# Patient Record
Sex: FEMALE | Race: WHITE | NOT HISPANIC OR LATINO | Employment: UNEMPLOYED | URBAN - METROPOLITAN AREA
[De-identification: names, ages, dates, MRNs, and addresses within clinical notes are randomized per-mention and may not be internally consistent; named-entity substitution may affect disease eponyms.]

---

## 2023-12-13 ENCOUNTER — TELEPHONE (OUTPATIENT)
Age: 28
End: 2023-12-13

## 2023-12-13 NOTE — TELEPHONE ENCOUNTER
Patient called because she took an at home pregnancy test and it came out positive, but she wants to double check with a blood test. She would like the doctor to upload a lab order for blood work so she can confirm. She said she spoke with her obgyn and they advised her to reach out to her pcp for an order. She would like the order uploaded to [address] [place] [address].

## 2023-12-13 NOTE — TELEPHONE ENCOUNTER
It does not look like the patient is established here yet, she has a future appointment with Dr. Radha Sandoval.

## 2023-12-14 NOTE — TELEPHONE ENCOUNTER
Pt aware, she is coming to see Dr. Megan Beth to establish in a few days and will address it at that point.

## 2023-12-21 ENCOUNTER — OFFICE VISIT (OUTPATIENT)
Dept: FAMILY MEDICINE CLINIC | Facility: MEDICAL CENTER | Age: 28
End: 2023-12-21
Payer: COMMERCIAL

## 2023-12-21 VITALS
WEIGHT: 144 LBS | SYSTOLIC BLOOD PRESSURE: 108 MMHG | OXYGEN SATURATION: 100 % | HEART RATE: 106 BPM | HEART RATE: 106 BPM | TEMPERATURE: 98 F | WEIGHT: 144 LBS | OXYGEN SATURATION: 100 % | HEIGHT: 64 IN | TEMPERATURE: 98 F | HEIGHT: 64 IN | RESPIRATION RATE: 16 BRPM | SYSTOLIC BLOOD PRESSURE: 108 MMHG | RESPIRATION RATE: 16 BRPM | DIASTOLIC BLOOD PRESSURE: 66 MMHG | BODY MASS INDEX: 24.59 KG/M2 | BODY MASS INDEX: 24.59 KG/M2 | DIASTOLIC BLOOD PRESSURE: 66 MMHG

## 2023-12-21 DIAGNOSIS — J02.9 SORE THROAT: ICD-10-CM

## 2023-12-21 DIAGNOSIS — R00.0 TACHYCARDIA: ICD-10-CM

## 2023-12-21 DIAGNOSIS — N92.6 MISSED PERIOD: ICD-10-CM

## 2023-12-21 DIAGNOSIS — Z13.29 SCREENING FOR THYROID DISORDER: ICD-10-CM

## 2023-12-21 DIAGNOSIS — Z13.220 SCREENING CHOLESTEROL LEVEL: ICD-10-CM

## 2023-12-21 DIAGNOSIS — Z11.4 SCREENING FOR HIV (HUMAN IMMUNODEFICIENCY VIRUS): ICD-10-CM

## 2023-12-21 DIAGNOSIS — Z11.59 NEED FOR HEPATITIS C SCREENING TEST: ICD-10-CM

## 2023-12-21 DIAGNOSIS — Z13.89 NEPHROPATHY SCREEN: ICD-10-CM

## 2023-12-21 DIAGNOSIS — Z76.89 ENCOUNTER TO ESTABLISH CARE: Primary | ICD-10-CM

## 2023-12-21 PROBLEM — Z78.9 NO KNOWN HEALTH PROBLEMS: Status: ACTIVE | Noted: 2023-12-21

## 2023-12-21 LAB
SL AMB POCT URINE HCG: POSITIVE
SL AMB POCT URINE HCG: POSITIVE

## 2023-12-21 PROCEDURE — 81025 URINE PREGNANCY TEST: CPT | Performed by: STUDENT IN AN ORGANIZED HEALTH CARE EDUCATION/TRAINING PROGRAM

## 2023-12-21 PROCEDURE — 99204 OFFICE O/P NEW MOD 45 MIN: CPT | Performed by: STUDENT IN AN ORGANIZED HEALTH CARE EDUCATION/TRAINING PROGRAM

## 2023-12-21 NOTE — PROGRESS NOTES
[facility] - Clinic Note  Rupert Palacios DO, 23     Christos Fall MRN: [id number] : 1995 Age: 28 y.o.     Assessment/Plan     1. Encounter to establish care    -Presents to establish care  -She establishing with OB/GYN next week  -She will return for annual physical in 6 months and sooner as needed    2. Screening cholesterol level    - Lipid Panel with Direct LDL reflex; Future    3. Nephropathy screen    - Comprehensive metabolic panel; Future    4. Screening for thyroid disorder    - TSH, 3rd generation with Free T4 reflex; Future    5. Need for hepatitis C screening test    - Hepatitis C Antibody; Future    6. Screening for HIV (human immunodeficiency virus)    - HIV 1/2 AG/AB w Reflex [facility] for 2 yr old and above; Future    7. Missed period    - hCG, quantitative; Future  - POCT urine HCG positive   -Appointment with OB/GYN next week    8. Tachycardia    -No chest pain, no shortness of breath, no palpitations  - TSH, 3rd generation with Free T4 reflex; Future  - CBC and differential; Future  - ECG 12 lead; Future    9. Sore throat    -Supportive care measures, call if symptoms worsening or not improving or appearance of new symptoms    Christos Fall acknowledged understanding of treatment plan, all questions answered.    Subjective      Christos Fall is a 28 y.o. female who presents to establish care.  Patient has not had a PCP prior to this.  Patient does not have a gynecologist as of yet but, does states she has an appointment with OB/GYN next week.  Patient states she never had a Pap smear.  Patient states she took an at home pregnancy test on 23 and it was positive.  She is currently taking a prenatal vitamin daily.  She denies any allergies to food, environment or medications.  She does not smoke or consume alcohol.  No past surgical history.  Patient states onset today bit of a sore throat otherwise no other complaints.  She denies any known sick contacts. Patient works as a  ".  Elevated heart rate today, patient describes she has not seen a PCP prior to this but, is not overtly anxious.  She denies any chest pain, shortness of breath or palpitations.    The following portions of the patient's history were reviewed and updated as appropriate: allergies, current medications, past family history, past medical history, past social history, past surgical history and problem list.     History reviewed. No pertinent past medical history.    No Known Allergies    History reviewed. No pertinent surgical history.    History reviewed. No pertinent family history.    Social History     Socioeconomic History    Marital status: /Civil Union     Spouse name: None    Number of children: None    Years of education: None    Highest education level: None   Occupational History    None   Tobacco Use    Smoking status: Never    Smokeless tobacco: Never   Substance and Sexual Activity    Alcohol use: None    Drug use: None    Sexual activity: None   Other Topics Concern    None   Social History Narrative    None     Social Determinants of Health     Financial Resource Strain: Not on file   Food Insecurity: Not on file   Transportation Needs: Not on file   Physical Activity: Not on file   Stress: Not on file   Social Connections: Not on file   Intimate Partner Violence: Not on file   Housing Stability: Not on file       Current Outpatient Medications   Medication Sig Dispense Refill    Prenatal Vit-Fe Fumarate-FA (PRENATAL VITAMINS PO) Take by mouth       No current facility-administered medications for this visit.       Review of Systems     As noted in HPI    Objective      /66 (BP Location: Left arm, Patient Position: Sitting, Cuff Size: Adult)   Pulse (!) 106   Temp 98 °F (36.7 °C)   Resp 16   Ht 5' 4.17\" (1.63 m)   Wt 65.3 kg (144 lb)   LMP 11/16/2023 (Exact Date)   SpO2 100%   BMI 24.58 kg/m²     Physical Exam  Vitals reviewed.   Constitutional:       General: She " "is not in acute distress.     Appearance: Normal appearance.   HENT:      Head: Normocephalic and atraumatic.      Nose: Nose normal.      Mouth/Throat:      Mouth: Mucous membranes are moist.      Pharynx: Oropharynx is clear. Posterior oropharyngeal erythema present. No oropharyngeal exudate.   Eyes:      General:         Right eye: No discharge.         Left eye: No discharge.      Extraocular Movements: Extraocular movements intact.      Conjunctiva/sclera: Conjunctivae normal.      Pupils: Pupils are equal, round, and reactive to light.   Cardiovascular:      Rate and Rhythm: Regular rhythm. Tachycardia present.      Pulses: Normal pulses.      Heart sounds: Normal heart sounds.   Pulmonary:      Effort: Pulmonary effort is normal.      Breath sounds: Normal breath sounds.   Abdominal:      General: Abdomen is flat. Bowel sounds are normal.      Palpations: Abdomen is soft.      Tenderness: There is no abdominal tenderness.   Musculoskeletal:      Cervical back: Neck supple.      Right lower leg: No edema.      Left lower leg: No edema.   Lymphadenopathy:      Cervical: No cervical adenopathy.   Skin:     General: Skin is warm and dry.      Capillary Refill: Capillary refill takes less than 2 seconds.   Neurological:      Mental Status: She is alert and oriented to person, place, and time.   Psychiatric:         Mood and Affect: Mood normal.         Behavior: Behavior normal.         Thought Content: Thought content normal.         Judgment: Judgment normal.             Some portions of this record may have been generated with voice recognition software. There may be translation, syntax, or grammatical errors. Occasional wrong word or \"sound-a-like\" substitutions may have occurred due to the inherent limitations of the voice recognition software. Read the chart carefully and recognize, using context, where substations may have occurred. If you have any questions, please contact the dictating provider for " clarification or correction, as needed.

## 2023-12-22 ENCOUNTER — APPOINTMENT (OUTPATIENT)
Dept: LAB | Facility: MEDICAL CENTER | Age: 28
End: 2023-12-22
Payer: COMMERCIAL

## 2023-12-22 DIAGNOSIS — R00.0 TACHYCARDIA: ICD-10-CM

## 2023-12-22 DIAGNOSIS — Z13.89 NEPHROPATHY SCREEN: ICD-10-CM

## 2023-12-22 DIAGNOSIS — Z13.29 SCREENING FOR THYROID DISORDER: ICD-10-CM

## 2023-12-22 DIAGNOSIS — N92.6 MISSED PERIOD: ICD-10-CM

## 2023-12-22 DIAGNOSIS — Z11.4 SCREENING FOR HIV (HUMAN IMMUNODEFICIENCY VIRUS): ICD-10-CM

## 2023-12-22 DIAGNOSIS — Z11.59 NEED FOR HEPATITIS C SCREENING TEST: ICD-10-CM

## 2023-12-22 DIAGNOSIS — Z13.220 SCREENING CHOLESTEROL LEVEL: ICD-10-CM

## 2023-12-22 LAB
ALBUMIN SERPL BCP-MCNC: 4.5 G/DL (ref 3.5–5)
ALBUMIN SERPL BCP-MCNC: 4.5 G/DL (ref 3.5–5)
ALP SERPL-CCNC: 42 U/L (ref 34–104)
ALP SERPL-CCNC: 42 U/L (ref 34–104)
ALT SERPL W P-5'-P-CCNC: 21 U/L (ref 7–52)
ALT SERPL W P-5'-P-CCNC: 21 U/L (ref 7–52)
ANION GAP SERPL CALCULATED.3IONS-SCNC: 10 MMOL/L
ANION GAP SERPL CALCULATED.3IONS-SCNC: 10 MMOL/L
AST SERPL W P-5'-P-CCNC: 22 U/L (ref 13–39)
AST SERPL W P-5'-P-CCNC: 22 U/L (ref 13–39)
B-HCG SERPL-ACNC: ABNORMAL MIU/ML (ref 0–5)
B-HCG SERPL-ACNC: ABNORMAL MIU/ML (ref 0–5)
BASOPHILS # BLD AUTO: 0.02 THOUSANDS/ÂΜL (ref 0–0.1)
BASOPHILS # BLD AUTO: 0.02 THOUSANDS/ÂΜL (ref 0–0.1)
BASOPHILS NFR BLD AUTO: 0 % (ref 0–1)
BASOPHILS NFR BLD AUTO: 0 % (ref 0–1)
BILIRUB SERPL-MCNC: 0.71 MG/DL (ref 0.2–1)
BILIRUB SERPL-MCNC: 0.71 MG/DL (ref 0.2–1)
BUN SERPL-MCNC: 10 MG/DL (ref 5–25)
BUN SERPL-MCNC: 10 MG/DL (ref 5–25)
CALCIUM SERPL-MCNC: 9.4 MG/DL (ref 8.4–10.2)
CALCIUM SERPL-MCNC: 9.4 MG/DL (ref 8.4–10.2)
CHLORIDE SERPL-SCNC: 103 MMOL/L (ref 96–108)
CHLORIDE SERPL-SCNC: 103 MMOL/L (ref 96–108)
CHOLEST SERPL-MCNC: 185 MG/DL
CHOLEST SERPL-MCNC: 185 MG/DL
CO2 SERPL-SCNC: 23 MMOL/L (ref 21–32)
CO2 SERPL-SCNC: 23 MMOL/L (ref 21–32)
CREAT SERPL-MCNC: 0.61 MG/DL (ref 0.6–1.3)
CREAT SERPL-MCNC: 0.61 MG/DL (ref 0.6–1.3)
EOSINOPHIL # BLD AUTO: 0.04 THOUSAND/ÂΜL (ref 0–0.61)
EOSINOPHIL # BLD AUTO: 0.04 THOUSAND/ÂΜL (ref 0–0.61)
EOSINOPHIL NFR BLD AUTO: 1 % (ref 0–6)
EOSINOPHIL NFR BLD AUTO: 1 % (ref 0–6)
ERYTHROCYTE [DISTWIDTH] IN BLOOD BY AUTOMATED COUNT: 13.8 % (ref 11.6–15.1)
ERYTHROCYTE [DISTWIDTH] IN BLOOD BY AUTOMATED COUNT: 13.8 % (ref 11.6–15.1)
GFR SERPL CREATININE-BSD FRML MDRD: 124 ML/MIN/1.73SQ M
GFR SERPL CREATININE-BSD FRML MDRD: 124 ML/MIN/1.73SQ M
GLUCOSE P FAST SERPL-MCNC: 81 MG/DL (ref 65–99)
GLUCOSE P FAST SERPL-MCNC: 81 MG/DL (ref 65–99)
HCT VFR BLD AUTO: 39.1 % (ref 34.8–46.1)
HCT VFR BLD AUTO: 39.1 % (ref 34.8–46.1)
HCV AB SER QL: NORMAL
HCV AB SER QL: NORMAL
HDLC SERPL-MCNC: 63 MG/DL
HDLC SERPL-MCNC: 63 MG/DL
HGB BLD-MCNC: 13.1 G/DL (ref 11.5–15.4)
HGB BLD-MCNC: 13.1 G/DL (ref 11.5–15.4)
HIV 1+2 AB+HIV1 P24 AG SERPL QL IA: NORMAL
HIV 1+2 AB+HIV1 P24 AG SERPL QL IA: NORMAL
HIV 2 AB SERPL QL IA: NORMAL
HIV 2 AB SERPL QL IA: NORMAL
HIV1 AB SERPL QL IA: NORMAL
HIV1 AB SERPL QL IA: NORMAL
HIV1 P24 AG SERPL QL IA: NORMAL
HIV1 P24 AG SERPL QL IA: NORMAL
IMM GRANULOCYTES # BLD AUTO: 0.01 THOUSAND/UL (ref 0–0.2)
IMM GRANULOCYTES # BLD AUTO: 0.01 THOUSAND/UL (ref 0–0.2)
IMM GRANULOCYTES NFR BLD AUTO: 0 % (ref 0–2)
IMM GRANULOCYTES NFR BLD AUTO: 0 % (ref 0–2)
LDLC SERPL CALC-MCNC: 112 MG/DL (ref 0–100)
LDLC SERPL CALC-MCNC: 112 MG/DL (ref 0–100)
LYMPHOCYTES # BLD AUTO: 1.28 THOUSANDS/ÂΜL (ref 0.6–4.47)
LYMPHOCYTES # BLD AUTO: 1.28 THOUSANDS/ÂΜL (ref 0.6–4.47)
LYMPHOCYTES NFR BLD AUTO: 18 % (ref 14–44)
LYMPHOCYTES NFR BLD AUTO: 18 % (ref 14–44)
MCH RBC QN AUTO: 28.7 PG (ref 26.8–34.3)
MCH RBC QN AUTO: 28.7 PG (ref 26.8–34.3)
MCHC RBC AUTO-ENTMCNC: 33.5 G/DL (ref 31.4–37.4)
MCHC RBC AUTO-ENTMCNC: 33.5 G/DL (ref 31.4–37.4)
MCV RBC AUTO: 86 FL (ref 82–98)
MCV RBC AUTO: 86 FL (ref 82–98)
MONOCYTES # BLD AUTO: 0.85 THOUSAND/ÂΜL (ref 0.17–1.22)
MONOCYTES # BLD AUTO: 0.85 THOUSAND/ÂΜL (ref 0.17–1.22)
MONOCYTES NFR BLD AUTO: 12 % (ref 4–12)
MONOCYTES NFR BLD AUTO: 12 % (ref 4–12)
NEUTROPHILS # BLD AUTO: 4.94 THOUSANDS/ÂΜL (ref 1.85–7.62)
NEUTROPHILS # BLD AUTO: 4.94 THOUSANDS/ÂΜL (ref 1.85–7.62)
NEUTS SEG NFR BLD AUTO: 69 % (ref 43–75)
NEUTS SEG NFR BLD AUTO: 69 % (ref 43–75)
NRBC BLD AUTO-RTO: 0 /100 WBCS
NRBC BLD AUTO-RTO: 0 /100 WBCS
PLATELET # BLD AUTO: 168 THOUSANDS/UL (ref 149–390)
PLATELET # BLD AUTO: 168 THOUSANDS/UL (ref 149–390)
PMV BLD AUTO: 11 FL (ref 8.9–12.7)
PMV BLD AUTO: 11 FL (ref 8.9–12.7)
POTASSIUM SERPL-SCNC: 3.8 MMOL/L (ref 3.5–5.3)
POTASSIUM SERPL-SCNC: 3.8 MMOL/L (ref 3.5–5.3)
PROT SERPL-MCNC: 7.7 G/DL (ref 6.4–8.4)
PROT SERPL-MCNC: 7.7 G/DL (ref 6.4–8.4)
RBC # BLD AUTO: 4.57 MILLION/UL (ref 3.81–5.12)
RBC # BLD AUTO: 4.57 MILLION/UL (ref 3.81–5.12)
SODIUM SERPL-SCNC: 136 MMOL/L (ref 135–147)
SODIUM SERPL-SCNC: 136 MMOL/L (ref 135–147)
TRIGL SERPL-MCNC: 49 MG/DL
TRIGL SERPL-MCNC: 49 MG/DL
TSH SERPL DL<=0.05 MIU/L-ACNC: 2.29 UIU/ML (ref 0.45–4.5)
TSH SERPL DL<=0.05 MIU/L-ACNC: 2.29 UIU/ML (ref 0.45–4.5)
WBC # BLD AUTO: 7.14 THOUSAND/UL (ref 4.31–10.16)
WBC # BLD AUTO: 7.14 THOUSAND/UL (ref 4.31–10.16)

## 2023-12-22 PROCEDURE — 87389 HIV-1 AG W/HIV-1&-2 AB AG IA: CPT

## 2023-12-22 PROCEDURE — 80061 LIPID PANEL: CPT

## 2023-12-22 PROCEDURE — 86803 HEPATITIS C AB TEST: CPT

## 2023-12-22 PROCEDURE — 36415 COLL VENOUS BLD VENIPUNCTURE: CPT

## 2023-12-22 PROCEDURE — 85025 COMPLETE CBC W/AUTO DIFF WBC: CPT

## 2023-12-22 PROCEDURE — 84443 ASSAY THYROID STIM HORMONE: CPT

## 2023-12-22 PROCEDURE — 84702 CHORIONIC GONADOTROPIN TEST: CPT

## 2023-12-22 PROCEDURE — 80053 COMPREHEN METABOLIC PANEL: CPT

## 2024-01-10 ENCOUNTER — ULTRASOUND (OUTPATIENT)
Dept: OBGYN CLINIC | Facility: CLINIC | Age: 29
End: 2024-01-10
Payer: COMMERCIAL

## 2024-01-10 DIAGNOSIS — O36.80X0 ENCOUNTER TO DETERMINE FETAL VIABILITY OF PREGNANCY, SINGLE OR UNSPECIFIED FETUS: Primary | ICD-10-CM

## 2024-01-10 NOTE — PROGRESS NOTES
Procedures    Serial transvaginal images were obtained through the gravid maternal uterus. The patient's LMP was 11/16/2023 with an ROBBIE of  8/22/2023 and a gestational age of  7w6d (based on LMP).   The indication for today's examination is to confirm fetal size and viability.    CRL=  1.79cm    8w2d    ROBBIE by US 8/19/2024  YS=  3.3mm  FHR=  167bpm    The uterus and bilateral ovaries appear within normal limits. The left ovary demonstrates a 2.3cm simple cyst.     Uterus= 10.22 x 6.92 x 7.54cm  Rt Ovary= 2.65 x 1.76 x 2.32cm  Lt. Ovary= 3.30 x 2.38 x 2.45cm    Impression: Single, viable IUP.     Christelle Gusman RDMS    GE [id number]-[id number] transvaginal transducer Serial #[id number] was used to perform the examination today and subsequently followed with high level disinfection utilizing Trophon EPR procedure.    [facility]  [facility]  [address] [place] [id number]  Phone  [phone]  Fax  [phone]

## 2024-01-15 ENCOUNTER — INITIAL PRENATAL (OUTPATIENT)
Dept: OBGYN CLINIC | Facility: CLINIC | Age: 29
End: 2024-01-15

## 2024-01-15 ENCOUNTER — TELEPHONE (OUTPATIENT)
Dept: OBGYN CLINIC | Facility: CLINIC | Age: 29
End: 2024-01-15

## 2024-01-15 VITALS — HEIGHT: 64 IN | BODY MASS INDEX: 24.09 KG/M2 | WEIGHT: 141.09 LBS

## 2024-01-15 DIAGNOSIS — Z34.01 ENCOUNTER FOR SUPERVISION OF NORMAL FIRST PREGNANCY IN FIRST TRIMESTER: Primary | ICD-10-CM

## 2024-01-15 PROCEDURE — OBC

## 2024-01-15 NOTE — PATIENT INSTRUCTIONS
Congratulations on your pregnancy!  We thank you for allowing us to participate in your care.    NEXT STEPS  Go to the lab to have your prenatal bloodwork competed if you have not already done so.  Call M to schedule your upcoming appointments with them.  We have entered a referral to their office and they will know how to schedule you appropriately.    Contact information for Maternal Fetal Medicine is located in your prenatal folder.  The main phone number to their office is [phone].  The scheduling of your appointment is time sensitive, so please call their office today or tomorrow to set up your appointment.   Return to our office for your first routine prenatal visit which was scheduled today.   Call our office at [phone] if you have any problems or concerns prior to your routine scheduled appointment.    Remember to only use [employer] for none urgent concerns or questions.    Please click on the links below to review our Pregnancy Essential Guide and for a virtual tour of Labor and Delivery at our [facility].      [facility] Pregnancy Essentials Guide  [facility] ([email or web address])      Virtual Tour of [facility] [place] [address]   Nausea and Vomiting in Pregnancy   WHAT YOU NEED TO KNOW:   What do I need to know about nausea and vomiting in pregnancy?  Nausea and vomiting can happen any time of day. These symptoms usually start before the 9th week of pregnancy, and end by the 14th week (second trimester). Some women can have nausea and vomiting for a longer time. These symptoms can make it hard for you to do your daily activities.   What increases my risk for nausea and vomiting in pregnancy?   Being pregnant with more than one baby    Nausea and vomiting in a past pregnancy    Having a sister or mother who had nausea and vomiting during pregnancy    History of migraine headaches or motion  sickness    Being pregnant with a female baby    How is nausea and vomiting in pregnancy treated?  Treatment for nausea and vomiting in pregnancy is usually not needed. You can make changes in the foods you eat and in your activities to help manage your symptoms. You may need to try several things to learn what works for you. Talk to your healthcare provider if your symptoms do not decrease with the changes suggested below.   What nutrition changes can I make to manage nausea and vomiting?   Eat smaller meals, more often.  Eat a small snack, such as crackers, dry cereal, or a small sandwich before you go to bed.    Eat some crackers or dry toast before you get out of bed in the morning.  Get out of bed slowly. Sudden movements could cause you to get dizzy and nauseated.     Eat bland foods when you feel nauseated.  Examples of bland foods include dry toast, dry cereal, plain pasta, white rice, and bread. Other bland foods include saltine crackers, bananas, gelatin, and pretzels. Avoid spicy, greasy, and fried foods.    Drink liquids that contain ginger.  Drink ginger ale made with real ginger or ginger tea made with fresh grated ginger. Ginger capsules or ginger candies may also help to decrease nausea and vomiting.     Drink liquids between meals instead of with meals.  Wait at least 30 minutes after you eat to drink liquids. Drink small amounts of liquids often throughout the day to prevent dehydration. Ask how much liquid you should drink each day.    What other changes can I make to manage nausea and vomiting?   Avoid smells that bother you.  Strong odors may cause nausea and vomiting to start, or make it worse.     Do not brush your teeth right after you eat  if it makes you nauseated.    Rest when you need to.  Start activity slowly and work up to your usual routine as you start to feel better.    Talk to your healthcare provider about your prenatal vitamins.  Prenatal vitamins can cause nausea for some women.  Try taking your prenatal vitamin at night or with a snack. If this change does not help, your healthcare provider may recommend a different type of vitamin.     Light to moderate exercise  may help to decrease your symptoms. It may also help you to sleep better at night. Ask your healthcare provider about the best exercise plan for you.    When should I seek immediate care?   You are dizzy, cold, and thirsty, and your eyes and mouth are dry.    You are urinating very little or not at all.    You are dizzy or lightheaded when you stand up.    You see blood or material that looks like coffee grounds in your vomit.    When should I call my doctor?   You vomit more than 4 times in 1 day.    You have not been able to keep liquids down for more than 1 day.    You lose more than 2 pounds.    You have a fever.    Your nausea and vomiting continue longer than 14 weeks.     You have questions or concerns about your condition or care.    CARE AGREEMENT:   You have the right to help plan your care. Learn about your health condition and how it may be treated. Discuss treatment options with your healthcare providers to decide what care you want to receive. You always have the right to refuse treatment. The above information is an  only. It is not intended as medical advice for individual conditions or treatments. Talk to your doctor, nurse or pharmacist before following any medical regimen to see if it is safe and effective for you.  © Copyright [employer] 2023 Information is for End User's use only and may not be sold, redistributed or otherwise used for commercial purposes.

## 2024-01-15 NOTE — PROGRESS NOTES
The patient was identified by name and date of birth and was informed that this is a virtual visit being conducted through a secure, HIPAA-complaint platform. I am in a private office space with the door closed. Patient acknowledged understanding of privacy.  She agrees to proceed and is aware that she may discontinue the visit at any time.    OB INTAKE INTERVIEW    January 15, 2024    OB History    Para Term  AB Living   1             SAB IAB Ectopic Multiple Live Births                  # Outcome Date GA Lbr Mahesh/2nd Weight Sex Delivery Anes PTL Lv   1 Current                History reviewed. No pertinent past medical history.    History reviewed. No pertinent surgical history.      Current Outpatient Medications:     doxylamine (UNISOM) 25 MG tablet, Take 1 tablet (25 mg total) by mouth daily at bedtime as needed for sleep, Disp: 30 tablet, Rfl: 1    Prenatal Vit-Fe Fumarate-FA (PRENATAL VITAMINS PO), Take by mouth, Disp: , Rfl:     pyridoxine (B-6) 25 MG tablet, Take 1 tablet (25 mg total) by mouth daily, Disp: 30 tablet, Rfl: 1    No Known Allergies    family history includes No Known Problems in her father, maternal grandfather, maternal grandmother, mother, paternal grandfather, paternal grandmother, sister, and sister.    Estimated Date of Delivery: 2024  Last Menstrual Period: Patient's last menstrual period was 2023 (exact date).    Simon Nguyễn is a 29 y.o. female  1 para 0 pregnant female who presents for her obstetrical intake. This pregnancy was planned.  Father of the baby is involved.    Pregnancy symptoms: nausea and frequent urination    There were no vitals filed for this visit.          BMI: Body mass index is There is no height or weight on file to calculate BMI.   Discussed appropriate weight gain in pregnancy based on pre-gravid BMI.     Diabetes  First degree relative with type 2 diabetes:no              Pregestational DM:  no              hx of GDM: no               hx of PCOS: no              prior hx of LGA/macrosomia (weight ore than 9 lbs) :no           Hypertension   Age 35 or older:no   Obesity (BMI over 30):no              Hx of chronic HTN: no              hx of gestational HTN:no              hx of preeclampsia, eclampsia, or HELLP syndrome: no              Family h/o preeclampsia:no    Autoimmune disease (systemic lupus erythematosus, antiphospholipid antibody syndrome):no   Nulliparity:YES              Multifetal gestation: no  More than 10 year pregnancy interval:no  Previous IUGR, low birthweight or small for gestational age:no     Infection Screening              Does the pt have a hx of MRSA? no              Recent travel outside of [place]? no     Thyroid- if yes order TSH with reflex T4  History of thyroid disease no    Bleeding Disorder or Hx of DVT-patient or first degree relative with history of. Order the following if not done previously.   (Factor V, antithrombin III, prothrombin gene mutation, protein C and S Ag, lupus anticoagulant, anticardiolipin, beta-2 glycoprotein)   no    OB/GYN-  Date of last pap on file:  12/28/2023  History of abnormal pap smear no  History of HPV no  History of Herpes/HSV no  History of other STI (gonorrhea, chlamydia, trich) no    History of blood transfusion no  Ok for blood transfusion Yes     Substance screening  Social History     Tobacco Use    Smoking status: Never    Smokeless tobacco: Never   Vaping Use    Vaping status: Never Used   Substance Use Topics    Alcohol use: Never    Drug use: Never       Parents:  Did any of your parents have a problem with alcohol or other drug use? no  Partner: Does your partner have a problem with alcohol or drug use? no  Past: In the past, have you had difficulties in your life because of alcohol or other drugs, including prescription medication? no  Present: In the past month have you drunk any alcohol or used other drugs? no    Genetic/MFM-  See prenatal genetic history screening in  the prenatal episode for genetic history.   Discussed carrier screening and consultation with [facility]. Patient is not interested.  Referral to [facility] given for anatomy (level 2) scan.  Patient provided with contact information for [facility] and advised to call to schedule appointment with them.   Cystic Fibrosis Screening offered.  Patient was advised to check insurance coverage prior to testing.  Patient is not interested.       Orders Placed This Encounter   Procedures    Urine culture    Hgb Fractionation Cascade    Varicella zoster antibody, IgG    HIV 1/2 AG/AB w Reflex [facility] for 2 yr old and above    Hepatitis C antibody    UA (URINE) with reflex to Scope    Hepatitis B surface antigen    CBC and differential    Rubella antibody, IgG    RPR-Syphilis Screening (Total Syphilis IGG/IGM)    Hepatitis B surface antibody    Anemia Panel w/Reflex, OB    Ambulatory Referral to Maternal Fetal Medicine    Type and screen     Prenatal lab work scripts given to patient for prenatal testing.  Patient was advised to have initial prenatal blood work, that was ordered today, done within the next week.  Verbalized understanding.    Breast Feeding  Breastfeeding benefits discussed and patient does plan to breastfeed.    Immunizations:  Discussed Flu, COVID, and Tdap, vaccinations. Vaccinations Immunizations Activity    Interview education  HIV and other prenatal labs and testing discussed.    Genetic testing discussed.  Patient instructed to check insurance coverage for testing and capitated lab.    Indications for ultrasonography reviewed.    Use of any medications (including supplements, vitamins, herbs or OTC drugs) discussed.  Reinforced daily use of prenatal vitamin.  Influenza, Covid, and Tdap vaccines counseled and recommendations reviewed.    Weight gain counseling discussed including nutrition counseling; special diet, dietary precautions (mercury, listeriosis).  Reviewed exercise recommendations and routine restrictions for  activity including no lifting greater than 20 lbs.     Environmental/work hazards reviewed including temperature guidelines and no prolonged stationary standing.  Avoidance of saunas, hot tubs, and tanning beds reviewed.  Toxoplasmosis precautions (cats/raw meat/unwashed vegetables) discussed.  Tobacco/smoking, alcohol, and illicit/recreation drug usage reviewed.  Travel safety precautions reviewed including avoiding travel where risk of congenitally transmitted infection(s) is high.     Pregnancy packet/folder provided and review with patient.    Information on childbirth classes/hospital facilities and Baby and Me resources provided.    The patient was oriented to our practice, reviewed delivering physicians and [facility] for Delivery. Patient instructed to always contact the office via phone with any urgent needs or concerns and not to utilize Mychart for emergencies.  Anticipated course of prenatal care including how and when to contact providers reviewed.  Will return to the office for first routine prenatal appointment. This appointment is scheduled.  All questions answered. Patient verbalized understanding.    Additional details that I feel the provider should be aware of: no additional-Unsure of genetic testing at this time.  Will call back to have ordered if interested.     Interviewed by: NATALIIA WINTER LPN

## 2024-01-15 NOTE — TELEPHONE ENCOUNTER
Called patient.  Has appt today in [place] for intake appt.  Power is out at the [place] office.  Called patient.  LM to CB.  May need to reschedule appt.  Told patient to ask to speak to [place]

## 2024-01-18 ENCOUNTER — TELEPHONE (OUTPATIENT)
Facility: HOSPITAL | Age: 29
End: 2024-01-18

## 2024-01-18 NOTE — TELEPHONE ENCOUNTER
Called patient to schedule MFM appointment, based on referral issued to Maternal Fetal Medicine by OB office.    PT only wanted to schedule anatomy scan, and declined NT u/s and GC.

## 2024-01-19 DIAGNOSIS — N92.6 MISSED PERIOD: ICD-10-CM

## 2024-01-19 RX ORDER — DIPHENHYDRAMINE HYDROCHLORIDE 25 MG/1
CAPSULE ORAL
Qty: 90 TABLET | Refills: 1 | Status: SHIPPED | OUTPATIENT
Start: 2024-01-19

## 2024-01-30 ENCOUNTER — APPOINTMENT (OUTPATIENT)
Dept: LAB | Facility: MEDICAL CENTER | Age: 29
End: 2024-01-30
Payer: COMMERCIAL

## 2024-01-30 DIAGNOSIS — Z34.01 ENCOUNTER FOR SUPERVISION OF NORMAL FIRST PREGNANCY IN FIRST TRIMESTER: ICD-10-CM

## 2024-01-30 LAB
ABO GROUP BLD: NORMAL
BACTERIA UR QL AUTO: ABNORMAL /HPF
BASOPHILS # BLD AUTO: 0.02 THOUSANDS/ÂΜL (ref 0–0.1)
BASOPHILS NFR BLD AUTO: 0 % (ref 0–1)
BILIRUB UR QL STRIP: NEGATIVE
BLD GP AB SCN SERPL QL: NEGATIVE
CAOX CRY URNS QL MICRO: ABNORMAL /HPF
CLARITY UR: CLEAR
COLOR UR: YELLOW
EOSINOPHIL # BLD AUTO: 0.06 THOUSAND/ÂΜL (ref 0–0.61)
EOSINOPHIL NFR BLD AUTO: 1 % (ref 0–6)
ERYTHROCYTE [DISTWIDTH] IN BLOOD BY AUTOMATED COUNT: 13.9 % (ref 11.6–15.1)
GLUCOSE UR STRIP-MCNC: NEGATIVE MG/DL
HCT VFR BLD AUTO: 35.2 % (ref 34.8–46.1)
HGB BLD-MCNC: 11.6 G/DL (ref 11.5–15.4)
HGB UR QL STRIP.AUTO: NEGATIVE
HIV 1+2 AB+HIV1 P24 AG SERPL QL IA: NORMAL
HIV 2 AB SERPL QL IA: NORMAL
HIV1 AB SERPL QL IA: NORMAL
HIV1 P24 AG SERPL QL IA: NORMAL
IMM GRANULOCYTES # BLD AUTO: 0.03 THOUSAND/UL (ref 0–0.2)
IMM GRANULOCYTES NFR BLD AUTO: 0 % (ref 0–2)
KETONES UR STRIP-MCNC: NEGATIVE MG/DL
LEUKOCYTE ESTERASE UR QL STRIP: NEGATIVE
LYMPHOCYTES # BLD AUTO: 1.99 THOUSANDS/ÂΜL (ref 0.6–4.47)
LYMPHOCYTES NFR BLD AUTO: 26 % (ref 14–44)
MCH RBC QN AUTO: 28.2 PG (ref 26.8–34.3)
MCHC RBC AUTO-ENTMCNC: 33 G/DL (ref 31.4–37.4)
MCV RBC AUTO: 85 FL (ref 82–98)
MONOCYTES # BLD AUTO: 0.58 THOUSAND/ÂΜL (ref 0.17–1.22)
MONOCYTES NFR BLD AUTO: 8 % (ref 4–12)
MUCOUS THREADS UR QL AUTO: ABNORMAL
NEUTROPHILS # BLD AUTO: 5.08 THOUSANDS/ÂΜL (ref 1.85–7.62)
NEUTS SEG NFR BLD AUTO: 65 % (ref 43–75)
NITRITE UR QL STRIP: NEGATIVE
NON-SQ EPI CELLS URNS QL MICRO: ABNORMAL /HPF
NRBC BLD AUTO-RTO: 0 /100 WBCS
PH UR STRIP.AUTO: 5.5 [PH]
PLATELET # BLD AUTO: 158 THOUSANDS/UL (ref 149–390)
PMV BLD AUTO: 11.6 FL (ref 8.9–12.7)
PROT UR STRIP-MCNC: ABNORMAL MG/DL
RBC # BLD AUTO: 4.12 MILLION/UL (ref 3.81–5.12)
RBC #/AREA URNS AUTO: ABNORMAL /HPF
RH BLD: POSITIVE
RUBV IGG SERPL IA-ACNC: 234.4 IU/ML
SP GR UR STRIP.AUTO: 1.02 (ref 1–1.03)
SPECIMEN EXPIRATION DATE: NORMAL
TREPONEMA PALLIDUM IGG+IGM AB [PRESENCE] IN SERUM OR PLASMA BY IMMUNOASSAY: NORMAL
UROBILINOGEN UR STRIP-ACNC: <2 MG/DL
VZV IGG SER QL IA: ABNORMAL
WBC # BLD AUTO: 7.76 THOUSAND/UL (ref 4.31–10.16)
WBC #/AREA URNS AUTO: ABNORMAL /HPF

## 2024-01-30 PROCEDURE — 36415 COLL VENOUS BLD VENIPUNCTURE: CPT

## 2024-01-30 PROCEDURE — 86803 HEPATITIS C AB TEST: CPT

## 2024-01-30 PROCEDURE — 87389 HIV-1 AG W/HIV-1&-2 AB AG IA: CPT

## 2024-01-30 PROCEDURE — 87086 URINE CULTURE/COLONY COUNT: CPT

## 2024-01-30 PROCEDURE — 86762 RUBELLA ANTIBODY: CPT

## 2024-01-30 PROCEDURE — 86787 VARICELLA-ZOSTER ANTIBODY: CPT

## 2024-01-30 PROCEDURE — 86900 BLOOD TYPING SEROLOGIC ABO: CPT

## 2024-01-30 PROCEDURE — 87340 HEPATITIS B SURFACE AG IA: CPT

## 2024-01-30 PROCEDURE — 86850 RBC ANTIBODY SCREEN: CPT

## 2024-01-30 PROCEDURE — 83020 HEMOGLOBIN ELECTROPHORESIS: CPT

## 2024-01-30 PROCEDURE — 85025 COMPLETE CBC W/AUTO DIFF WBC: CPT

## 2024-01-30 PROCEDURE — 86901 BLOOD TYPING SEROLOGIC RH(D): CPT

## 2024-01-30 PROCEDURE — 86780 TREPONEMA PALLIDUM: CPT

## 2024-01-30 PROCEDURE — 86706 HEP B SURFACE ANTIBODY: CPT

## 2024-01-31 LAB
HBV SURFACE AB SER-ACNC: 69.9 MIU/ML
HBV SURFACE AG SER QL: NORMAL
HCV AB SER QL: NORMAL

## 2024-02-01 LAB
BACTERIA UR CULT: ABNORMAL
BACTERIA UR CULT: ABNORMAL

## 2024-02-02 LAB
HGB A MFR BLD: 2.6 % (ref 1.8–3.2)
HGB A MFR BLD: 97.4 % (ref 96.4–98.8)
HGB F MFR BLD: 0 % (ref 0–2)
HGB FRACT BLD-IMP: NORMAL
HGB S MFR BLD: 0 %

## 2024-02-08 ENCOUNTER — ROUTINE PRENATAL (OUTPATIENT)
Dept: OBGYN CLINIC | Facility: CLINIC | Age: 29
End: 2024-02-08

## 2024-02-08 VITALS
BODY MASS INDEX: 23.7 KG/M2 | HEIGHT: 64 IN | DIASTOLIC BLOOD PRESSURE: 72 MMHG | SYSTOLIC BLOOD PRESSURE: 120 MMHG | WEIGHT: 138.8 LBS

## 2024-02-08 DIAGNOSIS — Z34.01 ENCOUNTER FOR SUPERVISION OF NORMAL FIRST PREGNANCY IN FIRST TRIMESTER: Primary | ICD-10-CM

## 2024-02-08 DIAGNOSIS — O09.899 MATERNAL VARICELLA, NON-IMMUNE: ICD-10-CM

## 2024-02-08 DIAGNOSIS — Z28.39 MATERNAL VARICELLA, NON-IMMUNE: ICD-10-CM

## 2024-02-08 PROCEDURE — PNV: Performed by: NURSE PRACTITIONER

## 2024-02-08 NOTE — PROGRESS NOTES
30 yo  at 12w gestation.  Varicella non-immune.    2023 pap/G/C negative  PN labs: varicella non-immune (immunize postpartum); otherwise normal.  Reviewed results with couple.  Decided to forgo genetic screening.  Appt 24 w/ MFM for anatomy scan.    Denies quickening, LOF/VB or pain.    S=D, normal FHTs, normal BP    RV 4w

## 2024-02-16 DIAGNOSIS — N92.6 MISSED PERIOD: ICD-10-CM

## 2024-03-06 ENCOUNTER — ROUTINE PRENATAL (OUTPATIENT)
Dept: OBGYN CLINIC | Facility: CLINIC | Age: 29
End: 2024-03-06

## 2024-03-06 VITALS
BODY MASS INDEX: 24.07 KG/M2 | DIASTOLIC BLOOD PRESSURE: 76 MMHG | WEIGHT: 141 LBS | HEIGHT: 64 IN | HEART RATE: 96 BPM | SYSTOLIC BLOOD PRESSURE: 122 MMHG

## 2024-03-06 DIAGNOSIS — Z28.39 MATERNAL VARICELLA, NON-IMMUNE: Primary | ICD-10-CM

## 2024-03-06 DIAGNOSIS — Z3A.15 15 WEEKS GESTATION OF PREGNANCY: ICD-10-CM

## 2024-03-06 DIAGNOSIS — O09.899 MATERNAL VARICELLA, NON-IMMUNE: Primary | ICD-10-CM

## 2024-03-06 PROCEDURE — PNV: Performed by: OBSTETRICS & GYNECOLOGY

## 2024-03-06 NOTE — PROGRESS NOTES
Pt is a 29 y.o.  15w6d  Pregnancy is complicated by varicella non-immune    Pt reports unsure if she has felt quickening.     Denies vb, lof, ctx. PTL precautions reviewed    Declines MSAFP    F/U 4 weeks.

## 2024-04-03 ENCOUNTER — ROUTINE PRENATAL (OUTPATIENT)
Facility: HOSPITAL | Age: 29
End: 2024-04-03
Payer: COMMERCIAL

## 2024-04-03 VITALS
SYSTOLIC BLOOD PRESSURE: 118 MMHG | DIASTOLIC BLOOD PRESSURE: 70 MMHG | WEIGHT: 145.72 LBS | BODY MASS INDEX: 24.88 KG/M2 | HEIGHT: 64 IN | HEART RATE: 85 BPM

## 2024-04-03 DIAGNOSIS — Z36.3 ENCOUNTER FOR ANTENATAL SCREENING FOR MALFORMATION USING ULTRASOUND: Primary | ICD-10-CM

## 2024-04-03 DIAGNOSIS — Z36.86 ENCOUNTER FOR ANTENATAL SCREENING FOR CERVICAL LENGTH: ICD-10-CM

## 2024-04-03 DIAGNOSIS — Z3A.19 19 WEEKS GESTATION OF PREGNANCY: ICD-10-CM

## 2024-04-03 PROCEDURE — 76817 TRANSVAGINAL US OBSTETRIC: CPT | Performed by: OBSTETRICS & GYNECOLOGY

## 2024-04-03 PROCEDURE — 99202 OFFICE O/P NEW SF 15 MIN: CPT | Performed by: OBSTETRICS & GYNECOLOGY

## 2024-04-03 PROCEDURE — 76805 OB US >/= 14 WKS SNGL FETUS: CPT | Performed by: OBSTETRICS & GYNECOLOGY

## 2024-04-03 NOTE — PROGRESS NOTES
The patient was seen today for an ultrasound.  Please see ultrasound report (located under Ob Procedures) for additional details.   Thank you very much for allowing us to participate in the care of this very nice patient.  Should you have any questions, please do not hesitate to contact me.     Shawn Morales MD FACOG  Attending Physician, Maternal-Fetal Medicine  [facility]

## 2024-04-03 NOTE — PROGRESS NOTES
Ultrasound Probe Disinfection    A transvaginal ultrasound was performed.   Prior to use, disinfection was performed with High Level Disinfection Process ([employer]).  Probe serial number [id number]: [id number] was used.      Moira Quintanilla  04/03/24  10:46 AM

## 2024-04-04 ENCOUNTER — ROUTINE PRENATAL (OUTPATIENT)
Dept: OBGYN CLINIC | Facility: CLINIC | Age: 29
End: 2024-04-04

## 2024-04-04 VITALS
HEIGHT: 64 IN | SYSTOLIC BLOOD PRESSURE: 116 MMHG | BODY MASS INDEX: 24.69 KG/M2 | DIASTOLIC BLOOD PRESSURE: 72 MMHG | WEIGHT: 144.6 LBS

## 2024-04-04 DIAGNOSIS — O09.899 MATERNAL VARICELLA, NON-IMMUNE: ICD-10-CM

## 2024-04-04 DIAGNOSIS — Z34.02 ENCOUNTER FOR SUPERVISION OF NORMAL FIRST PREGNANCY IN SECOND TRIMESTER: Primary | ICD-10-CM

## 2024-04-04 DIAGNOSIS — Z28.39 MATERNAL VARICELLA, NON-IMMUNE: ICD-10-CM

## 2024-04-04 DIAGNOSIS — N92.6 MISSED PERIOD: ICD-10-CM

## 2024-04-04 PROCEDURE — PNV: Performed by: NURSE PRACTITIONER

## 2024-04-04 NOTE — PROGRESS NOTES
30 yo  at 20w gestation.  Varicella non-immune    Prenatal labs normal.  Declined genetic screening.  4/3/24 level 2 US: normal anatomy    + quickening.  Denies LOF/VB or pain.  S=D, normal FHTs, normal BP    RV 4w

## 2024-05-03 ENCOUNTER — ROUTINE PRENATAL (OUTPATIENT)
Dept: OBGYN CLINIC | Facility: CLINIC | Age: 29
End: 2024-05-03

## 2024-05-03 VITALS
BODY MASS INDEX: 26.15 KG/M2 | SYSTOLIC BLOOD PRESSURE: 116 MMHG | OXYGEN SATURATION: 99 % | HEIGHT: 64 IN | HEART RATE: 90 BPM | DIASTOLIC BLOOD PRESSURE: 72 MMHG | WEIGHT: 153.2 LBS

## 2024-05-03 DIAGNOSIS — O09.92 ENCOUNTER FOR SUPERVISION OF HIGH RISK PREGNANCY IN SECOND TRIMESTER, ANTEPARTUM: Primary | ICD-10-CM

## 2024-05-03 DIAGNOSIS — Z3A.24 24 WEEKS GESTATION OF PREGNANCY: ICD-10-CM

## 2024-05-03 DIAGNOSIS — O09.899 MATERNAL VARICELLA, NON-IMMUNE: ICD-10-CM

## 2024-05-03 DIAGNOSIS — N92.6 MISSED PERIOD: ICD-10-CM

## 2024-05-03 DIAGNOSIS — Z28.39 MATERNAL VARICELLA, NON-IMMUNE: ICD-10-CM

## 2024-05-03 PROBLEM — Z34.01 ENCOUNTER FOR SUPERVISION OF NORMAL FIRST PREGNANCY IN FIRST TRIMESTER: Status: RESOLVED | Noted: 2024-02-08 | Resolved: 2024-05-03

## 2024-05-03 PROCEDURE — PNV: Performed by: OBSTETRICS & GYNECOLOGY

## 2024-05-03 RX ORDER — DIPHENHYDRAMINE HYDROCHLORIDE 25 MG/1
25 CAPSULE ORAL DAILY
Qty: 90 TABLET | Refills: 3 | Status: SHIPPED | OUTPATIENT
Start: 2024-05-03

## 2024-05-03 NOTE — LETTER
May 3, 2024     Patient: Simon Nguyễn  YOB: 1995  Date of Visit: 5/3/2024      To Whom it May Concern:    Simon Nguyễn is under my professional care. Simon was seen in my office on 5/3/2024. Simon is pregnant and is due on 8/22/24.    If you have any questions or concerns, please don't hesitate to call.         Sincerely,          Juliaan Howell MD        CC: No Recipients

## 2024-05-03 NOTE — PROGRESS NOTES
"Assessment & Plan  29 y.o.  at 24w1d presenting for routine prenatal visit.     Problem List       No known health problems    Maternal varicella, non-immune    Overview     Varivax postpartum         24 weeks gestation of pregnancy    Overview     Declines MSAFP  Level II US 4/3: f/u as clinically indicated  28 week labs ordered  Delivery consent [ ]  Tdap [ ]  GBS [ ]  Contraception [ ]          Other Visit Diagnoses       Encounter for supervision of high risk pregnancy in second trimester, antepartum    -  Primary          ____________________________________________________________  Subjective  She is without complaint.   She denies contractions, loss of fluid, or vaginal bleeding.   She feels regular fetal movements.     Objective  /72 (BP Location: Right arm, Patient Position: Sitting, Cuff Size: Standard)   Pulse 90   Ht 5' 4\" (1.626 m)   Wt 69.5 kg (153 lb 3.2 oz)   LMP 2023 (Exact Date)   SpO2 99%   BMI 26.30 kg/m²   FHR: 145 bpm  Fundal height 25 cm    Physical Exam  Constitutional:       Appearance: Normal appearance.   HENT:      Head: Normocephalic and atraumatic.   Cardiovascular:      Rate and Rhythm: Normal rate.   Pulmonary:      Effort: Pulmonary effort is normal. No respiratory distress.   Abdominal:      Palpations: Abdomen is soft.      Tenderness: There is no abdominal tenderness.   Musculoskeletal:         General: Normal range of motion.   Neurological:      Mental Status: She is alert.   Skin:     General: Skin is warm and dry.          Patient's Active Problem List  Patient Active Problem List   Diagnosis    No known health problems    Maternal varicella, non-immune    24 weeks gestation of pregnancy           Juliana Howell MD  5/3/2024  2:58 PM          "

## 2024-05-21 DIAGNOSIS — N92.6 MISSED PERIOD: ICD-10-CM

## 2024-05-29 ENCOUNTER — APPOINTMENT (OUTPATIENT)
Dept: LAB | Facility: MEDICAL CENTER | Age: 29
End: 2024-05-29
Payer: COMMERCIAL

## 2024-05-29 DIAGNOSIS — O09.92 ENCOUNTER FOR SUPERVISION OF HIGH RISK PREGNANCY IN SECOND TRIMESTER, ANTEPARTUM: ICD-10-CM

## 2024-05-29 LAB
ERYTHROCYTE [DISTWIDTH] IN BLOOD BY AUTOMATED COUNT: 13.1 % (ref 11.6–15.1)
GLUCOSE 1H P 50 G GLC PO SERPL-MCNC: 108 MG/DL (ref 70–134)
HCT VFR BLD AUTO: 31.8 % (ref 34.8–46.1)
HGB BLD-MCNC: 10.4 G/DL (ref 11.5–15.4)
MCH RBC QN AUTO: 29.1 PG (ref 26.8–34.3)
MCHC RBC AUTO-ENTMCNC: 32.7 G/DL (ref 31.4–37.4)
MCV RBC AUTO: 89 FL (ref 82–98)
PLATELET # BLD AUTO: 141 THOUSANDS/UL (ref 149–390)
PMV BLD AUTO: 11.6 FL (ref 8.9–12.7)
RBC # BLD AUTO: 3.57 MILLION/UL (ref 3.81–5.12)
TREPONEMA PALLIDUM IGG+IGM AB [PRESENCE] IN SERUM OR PLASMA BY IMMUNOASSAY: NORMAL
WBC # BLD AUTO: 7.85 THOUSAND/UL (ref 4.31–10.16)

## 2024-05-29 PROCEDURE — 82950 GLUCOSE TEST: CPT

## 2024-05-29 PROCEDURE — 86780 TREPONEMA PALLIDUM: CPT

## 2024-05-29 PROCEDURE — 85027 COMPLETE CBC AUTOMATED: CPT

## 2024-05-29 PROCEDURE — 36415 COLL VENOUS BLD VENIPUNCTURE: CPT

## 2024-05-31 ENCOUNTER — ROUTINE PRENATAL (OUTPATIENT)
Dept: OBGYN CLINIC | Facility: CLINIC | Age: 29
End: 2024-05-31

## 2024-05-31 VITALS
WEIGHT: 155.6 LBS | DIASTOLIC BLOOD PRESSURE: 74 MMHG | BODY MASS INDEX: 26.56 KG/M2 | HEART RATE: 101 BPM | HEIGHT: 64 IN | SYSTOLIC BLOOD PRESSURE: 126 MMHG | OXYGEN SATURATION: 100 %

## 2024-05-31 DIAGNOSIS — Z3A.28 28 WEEKS GESTATION OF PREGNANCY: Primary | ICD-10-CM

## 2024-05-31 PROCEDURE — PNV: Performed by: OBSTETRICS & GYNECOLOGY

## 2024-05-31 NOTE — PROGRESS NOTES
"Assessment & Plan  29 y.o.  at 28w1d presenting for routine prenatal visit.     Problem List       No known health problems    Maternal varicella, non-immune    Overview     Varivax postpartum         28 weeks gestation of pregnancy    Overview     Declines MSAFP  Level II US 4/3: f/u as clinically indicated  28 week labs wnl  Delivery consent [ ]  Tdap [ ]  GBS [ ]  Contraception [ ]          ____________________________________________________________  Subjective  She is without complaint.   She denies contractions, loss of fluid, or vaginal bleeding.   She feels regular fetal movements.     Objective  /74 (BP Location: Right arm, Patient Position: Sitting, Cuff Size: Standard)   Pulse 101   Ht 5' 4\" (1.626 m)   Wt 70.6 kg (155 lb 9.6 oz)   LMP 2023 (Exact Date)   SpO2 100%   BMI 26.71 kg/m²   FHR: 140 bpm  Fundal height 26 cm    Physical Exam  Constitutional:       Appearance: Normal appearance.   HENT:      Head: Normocephalic and atraumatic.   Cardiovascular:      Rate and Rhythm: Normal rate.   Pulmonary:      Effort: Pulmonary effort is normal. No respiratory distress.   Abdominal:      Palpations: Abdomen is soft.      Tenderness: There is no abdominal tenderness.   Musculoskeletal:         General: Normal range of motion.   Neurological:      Mental Status: She is alert.   Skin:     General: Skin is warm and dry.          Patient's Active Problem List  Patient Active Problem List   Diagnosis    No known health problems    Maternal varicella, non-immune    28 weeks gestation of pregnancy           Juliana Howell MD  2024  10:45 AM          "

## 2024-06-20 ENCOUNTER — ROUTINE PRENATAL (OUTPATIENT)
Dept: OBGYN CLINIC | Facility: CLINIC | Age: 29
End: 2024-06-20
Payer: COMMERCIAL

## 2024-06-20 VITALS
HEART RATE: 102 BPM | OXYGEN SATURATION: 100 % | BODY MASS INDEX: 27.04 KG/M2 | HEIGHT: 64 IN | DIASTOLIC BLOOD PRESSURE: 70 MMHG | SYSTOLIC BLOOD PRESSURE: 108 MMHG | WEIGHT: 158.4 LBS

## 2024-06-20 DIAGNOSIS — Z23 ENCOUNTER FOR IMMUNIZATION: ICD-10-CM

## 2024-06-20 DIAGNOSIS — Z3A.31 31 WEEKS GESTATION OF PREGNANCY: ICD-10-CM

## 2024-06-20 DIAGNOSIS — O99.013 ANEMIA AFFECTING PREGNANCY IN THIRD TRIMESTER: Primary | ICD-10-CM

## 2024-06-20 PROCEDURE — PNV: Performed by: OBSTETRICS & GYNECOLOGY

## 2024-06-20 PROCEDURE — 90471 IMMUNIZATION ADMIN: CPT | Performed by: OBSTETRICS & GYNECOLOGY

## 2024-06-20 PROCEDURE — 90715 TDAP VACCINE 7 YRS/> IM: CPT | Performed by: OBSTETRICS & GYNECOLOGY

## 2024-06-20 RX ORDER — FERROUS SULFATE 324(65)MG
324 TABLET, DELAYED RELEASE (ENTERIC COATED) ORAL DAILY
Qty: 30 TABLET | Refills: 2 | Status: SHIPPED | OUTPATIENT
Start: 2024-06-20 | End: 2024-09-18

## 2024-06-20 NOTE — PROGRESS NOTES
"Assessment & Plan  29 y.o.  at 31w0d presenting for routine prenatal visit.       Problem List       No known health problems    Maternal varicella, non-immune    Overview     Varivax postpartum         31 weeks gestation of pregnancy    Overview     Declines MSAFP  Level II US 4/3: f/u as clinically indicated  28 week labs wnl  Delivery consent [X]  Tdap [X] 24  GBS [ ]  Contraception [ ]         Anemia affecting pregnancy in third trimester    Overview     Hgb 10.4  Taking oral iron          ____________________________________________________________  Subjective  She is without complaint.   She denies contractions, loss of fluid, or vaginal bleeding.   She feels regular fetal movements.       Objective  /70 (BP Location: Right arm, Patient Position: Sitting, Cuff Size: Standard)   Pulse 102   Ht 5' 4\" (1.626 m)   Wt 71.8 kg (158 lb 6.4 oz)   LMP 2023 (Exact Date)   SpO2 100%   BMI 27.19 kg/m²   FHR: 130's via doppler    Physical Exam  Constitutional:       Appearance: She is well-developed.   Cardiovascular:      Rate and Rhythm: Normal rate and regular rhythm.      Heart sounds: Normal heart sounds. No murmur heard.     No friction rub. No gallop.   Pulmonary:      Effort: Pulmonary effort is normal. No respiratory distress.      Breath sounds: No wheezing.   Abdominal:      Palpations: Abdomen is soft.      Tenderness: There is no abdominal tenderness.   Musculoskeletal:         General: No tenderness.   Neurological:      Mental Status: She is alert and oriented to person, place, and time.   Vitals reviewed.          Patient's Active Problem List  Patient Active Problem List   Diagnosis    No known health problems    Maternal varicella, non-immune    31 weeks gestation of pregnancy    Anemia affecting pregnancy in third trimester           Jonah Pack MD  2024  8:55 AM          "

## 2024-06-26 ENCOUNTER — OFFICE VISIT (OUTPATIENT)
Dept: LAB | Facility: CLINIC | Age: 29
End: 2024-06-26
Payer: COMMERCIAL

## 2024-06-26 DIAGNOSIS — R00.0 TACHYCARDIA: ICD-10-CM

## 2024-06-26 PROCEDURE — 93005 ELECTROCARDIOGRAM TRACING: CPT

## 2024-06-27 LAB
ATRIAL RATE: 107 BPM
P AXIS: 37 DEGREES
PR INTERVAL: 124 MS
QRS AXIS: 49 DEGREES
QRSD INTERVAL: 68 MS
QT INTERVAL: 310 MS
QTC INTERVAL: 413 MS
T WAVE AXIS: 39 DEGREES
VENTRICULAR RATE: 107 BPM

## 2024-06-27 PROCEDURE — 93010 ELECTROCARDIOGRAM REPORT: CPT | Performed by: INTERNAL MEDICINE

## 2024-07-01 ENCOUNTER — ROUTINE PRENATAL (OUTPATIENT)
Dept: OBGYN CLINIC | Facility: CLINIC | Age: 29
End: 2024-07-01

## 2024-07-01 ENCOUNTER — OFFICE VISIT (OUTPATIENT)
Dept: FAMILY MEDICINE CLINIC | Facility: MEDICAL CENTER | Age: 29
End: 2024-07-01
Payer: COMMERCIAL

## 2024-07-01 VITALS
WEIGHT: 166 LBS | SYSTOLIC BLOOD PRESSURE: 130 MMHG | OXYGEN SATURATION: 98 % | BODY MASS INDEX: 28.34 KG/M2 | RESPIRATION RATE: 18 BRPM | DIASTOLIC BLOOD PRESSURE: 80 MMHG | HEART RATE: 99 BPM | TEMPERATURE: 97.1 F | HEIGHT: 64 IN

## 2024-07-01 VITALS
HEIGHT: 64 IN | SYSTOLIC BLOOD PRESSURE: 122 MMHG | DIASTOLIC BLOOD PRESSURE: 66 MMHG | BODY MASS INDEX: 27.93 KG/M2 | WEIGHT: 163.6 LBS | OXYGEN SATURATION: 99 % | HEART RATE: 108 BPM

## 2024-07-01 DIAGNOSIS — Z00.00 ANNUAL PHYSICAL EXAM: Primary | ICD-10-CM

## 2024-07-01 DIAGNOSIS — Z28.39 MATERNAL VARICELLA, NON-IMMUNE: ICD-10-CM

## 2024-07-01 DIAGNOSIS — O09.899 MATERNAL VARICELLA, NON-IMMUNE: ICD-10-CM

## 2024-07-01 DIAGNOSIS — O99.013 ANEMIA AFFECTING PREGNANCY IN THIRD TRIMESTER: Primary | ICD-10-CM

## 2024-07-01 DIAGNOSIS — Z3A.32 32 WEEKS GESTATION OF PREGNANCY: ICD-10-CM

## 2024-07-01 PROCEDURE — PNV: Performed by: OBSTETRICS & GYNECOLOGY

## 2024-07-01 PROCEDURE — 99395 PREV VISIT EST AGE 18-39: CPT | Performed by: STUDENT IN AN ORGANIZED HEALTH CARE EDUCATION/TRAINING PROGRAM

## 2024-07-01 NOTE — PROGRESS NOTES
[facility] HEALTH MAINTENANCE OFFICE VISIT  [facility] - [facility] [place]    NAME: Simon Nguyễn  AGE: 29 y.o. SEX: female  : 1995     DATE: 2024    Assessment and Plan     1. Annual physical exam      Patient Counseling:   Nutrition: Stressed importance of a well balanced diet, moderation of sodium/saturated fat, caloric balance and sufficient intake of fiber  Exercise: Stressed the importance of regular exercise with a goal of 150 minutes per week  Dental Health: Discussed daily flossing and brushing and regular dental visits   Immunizations reviewed:   Will plan for influenza vaccine in 2024  Tetanus booster up-to-date (current pregnancy)  Discussed benefits of:    No known family history of colon cancer  Follow with gynecology for well woman care, 2023 Pap smear normal cytology  BMI Counseling: Body mass index is 28.49 kg/m². Discussed with patient's BMI with her.       Depression Screening and Follow-up Plan: Patient was screened for depression during today's encounter. They screened negative with a PHQ-2 score of 0.      Follow-up in 6 months and sooner as needed.  Chief Complaint     Chief Complaint   Patient presents with    Physical Exam     ANNUAL       History of Present Illness     HPI    Well Adult Physical   Patient here for a comprehensive physical exam.  She presents with her .      Diet and Physical Activity  Diet: well balanced diet  Exercise: infrequently      Depression Screen  PHQ-2/9 Depression Screening    Little interest or pleasure in doing things: 0 - not at all  Feeling down, depressed, or hopeless: 0 - not at all  PHQ-2 Score: 0  PHQ-2 Interpretation: Negative depression screen          General Health  Hearing: Normal:  bilateral  Vision: goes for regular eye exams and wears glasses  Dental: regular dental visits, brushes teeth twice daily, and flosses teeth occasionally    Reproductive Health  Follows with gynecologist,  currently pregnant      The following portions of the patient's history were reviewed and updated as appropriate: allergies, current medications, past family history, past medical history, past social history, past surgical history and problem list.    Review of Systems     Review of Systems    As noted in HPI     Past Medical History     History reviewed. No pertinent past medical history.    Past Surgical History     History reviewed. No pertinent surgical history.    Social History     Social History     Socioeconomic History    Marital status: /Civil Union     Spouse name: None    Number of children: None    Years of education: None    Highest education level: None   Occupational History    None   Tobacco Use    Smoking status: Never    Smokeless tobacco: Never   Vaping Use    Vaping status: Never Used   Substance and Sexual Activity    Alcohol use: Never    Drug use: Never    Sexual activity: Yes     Partners: Male     Birth control/protection: Condom Male   Other Topics Concern    None   Social History Narrative    None     Social Determinants of Health     Financial Resource Strain: Not on file   Food Insecurity: No Food Insecurity (6/17/2024)    Hunger Vital Sign     Worried About Running Out of Food in the Last Year: Never true     Ran Out of Food in the Last Year: Never true   Transportation Needs: No Transportation Needs (6/17/2024)    PRAPARE - Transportation     Lack of Transportation (Medical): No     Lack of Transportation (Non-Medical): No   Physical Activity: Not on file   Stress: Not on file   Social Connections: Not on file   Intimate Partner Violence: Not on file   Housing Stability: Unknown (6/17/2024)    Housing Stability Vital Sign     Unable to Pay for Housing in the Last Year: No     Number of Times Moved in the Last Year: Not on file     Homeless in the Last Year: No       Family History     Family History   Problem Relation Age of Onset    No Known Problems Mother     No Known  "Problems Father     No Known Problems Sister     No Known Problems Sister     No Known Problems Maternal Grandmother     No Known Problems Maternal Grandfather     No Known Problems Paternal Grandmother     No Known Problems Paternal Grandfather        Current Medications       Current Outpatient Medications:     doxylamine (UNISOM) 25 MG tablet, Take 1 tablet (25 mg total) by mouth daily at bedtime as needed for sleep, Disp: 30 tablet, Rfl: 3    ferrous sulfate 324 (65 Fe) mg, Take 1 tablet (324 mg total) by mouth in the morning, Disp: 30 tablet, Rfl: 2    Prenatal Vit-Fe Fumarate-FA (PRENATAL VITAMINS PO), Take by mouth, Disp: , Rfl:     Pyridoxine HCl (vitamin B-6) 25 MG tablet, Take 1 tablet (25 mg total) by mouth daily, Disp: 90 tablet, Rfl: 3     Allergies     No Known Allergies    Objective     /80 (BP Location: Left arm, Patient Position: Sitting, Cuff Size: Standard)   Pulse 99   Temp (!) 97.1 °F (36.2 °C) (Temporal)   Resp 18   Ht 5' 4\" (1.626 m)   Wt 75.3 kg (166 lb)   LMP 11/16/2023 (Exact Date)   SpO2 98%   BMI 28.49 kg/m²      Physical Exam  Vitals reviewed.   Constitutional:       General: She is not in acute distress.     Appearance: Normal appearance.   HENT:      Head: Normocephalic and atraumatic.      Nose: Nose normal.      Mouth/Throat:      Mouth: Mucous membranes are moist.      Pharynx: Oropharynx is clear.   Eyes:      Extraocular Movements: Extraocular movements intact.      Conjunctiva/sclera: Conjunctivae normal.      Pupils: Pupils are equal, round, and reactive to light.   Cardiovascular:      Rate and Rhythm: Normal rate and regular rhythm.      Pulses: Normal pulses.      Heart sounds: Normal heart sounds.   Pulmonary:      Effort: Pulmonary effort is normal.      Breath sounds: Normal breath sounds.   Abdominal:      General: Bowel sounds are normal.      Tenderness: There is no abdominal tenderness.      Comments: Gravid uterus   Musculoskeletal:      Cervical back: " Neck supple.      Right lower leg: No edema.      Left lower leg: No edema.   Lymphadenopathy:      Cervical: No cervical adenopathy.   Skin:     General: Skin is warm and dry.   Neurological:      Mental Status: She is alert and oriented to person, place, and time.   Psychiatric:         Mood and Affect: Mood normal.         Behavior: Behavior normal.         Thought Content: Thought content normal.         Judgment: Judgment normal.           No results found.        Ava Brewer DO  [facility]

## 2024-07-01 NOTE — PROGRESS NOTES
Pt is a 29 y.o.  32w4d  Pregnancy is complicated by varicella non immune and anemia    Pt reports +FM. Denies vb, lof, ctx. PTL precautions and fkc reviewed    Recheck CBC-orders given  Reviewed contraceptive options--pt interested in Mirena vs Paragard--pamphlets given, she will let us know her final decision.   F/U 2 weeks

## 2024-07-03 ENCOUNTER — TELEPHONE (OUTPATIENT)
Dept: OBGYN CLINIC | Facility: CLINIC | Age: 29
End: 2024-07-03

## 2024-07-03 NOTE — TELEPHONE ENCOUNTER
Overall how are you feeling? Patient states she is doing well.    Compliant with routine OB appointments? Yes  Have you completed your 3rd trimester lab work? Yes-reminded patient to have repeat CBC done.  Order was given by Dr. Manley at last prenatal appt.    Have you reviewed the contents of 3rd trimester folder from office?  Yes   Have you decided on a pediatrician? No-still reviewing list    If yes, who:        If no, what are you looking for and request sent for outreach.   Questions on paperwork to go back to office? No   Questions on the baby birth certificate forms? No    EPDS Score:   0    Sent link for the Hospital Readiness Video via [employer]

## 2024-07-16 ENCOUNTER — ROUTINE PRENATAL (OUTPATIENT)
Dept: OBGYN CLINIC | Facility: CLINIC | Age: 29
End: 2024-07-16

## 2024-07-16 VITALS
SYSTOLIC BLOOD PRESSURE: 138 MMHG | HEIGHT: 64 IN | BODY MASS INDEX: 27.93 KG/M2 | DIASTOLIC BLOOD PRESSURE: 78 MMHG | OXYGEN SATURATION: 97 % | HEART RATE: 104 BPM | WEIGHT: 163.6 LBS

## 2024-07-16 DIAGNOSIS — O99.013 ANEMIA AFFECTING PREGNANCY IN THIRD TRIMESTER: Primary | ICD-10-CM

## 2024-07-16 DIAGNOSIS — Z3A.34 34 WEEKS GESTATION OF PREGNANCY: ICD-10-CM

## 2024-07-16 PROCEDURE — PNV: Performed by: OBSTETRICS & GYNECOLOGY

## 2024-07-16 NOTE — PROGRESS NOTES
"Assessment & Plan  29 y.o.  at 34w5d presenting for routine prenatal visit.       Problem List       No known health problems    Maternal varicella, non-immune    Overview     Varivax postpartum         32 weeks gestation of pregnancy    Overview     Declines MSAFP  Level II US 4/3: f/u as clinically indicated  28 week labs wnl  Delivery consent [X]  Tdap [X] 24  GBS [ ]  Contraception [ ]         Anemia affecting pregnancy in third trimester    Overview     Hgb 10.4  Taking oral iron          ____________________________________________________________  Subjective  She is without complaint.   She denies contractions, loss of fluid, or vaginal bleeding.   She feels regular fetal movements.       Objective  /78 (BP Location: Right arm, Patient Position: Sitting, Cuff Size: Standard)   Pulse 104   Ht 5' 4\" (1.626 m)   Wt 74.2 kg (163 lb 9.6 oz)   LMP 2023 (Exact Date)   SpO2 97%   BMI 28.08 kg/m²   FHR: 140's via doppler    Physical Exam  Constitutional:       Appearance: She is well-developed.   Cardiovascular:      Rate and Rhythm: Normal rate and regular rhythm.      Heart sounds: Normal heart sounds. No murmur heard.     No friction rub. No gallop.   Pulmonary:      Effort: Pulmonary effort is normal. No respiratory distress.      Breath sounds: No wheezing.   Abdominal:      Palpations: Abdomen is soft.      Tenderness: There is no abdominal tenderness.   Musculoskeletal:         General: No tenderness.   Neurological:      Mental Status: She is alert and oriented to person, place, and time.   Vitals reviewed.          Patient's Active Problem List  Patient Active Problem List   Diagnosis    No known health problems    Maternal varicella, non-immune    32 weeks gestation of pregnancy    Anemia affecting pregnancy in third trimester           Jonah Pack MD  2024  2:27 PM          "

## 2024-07-18 DIAGNOSIS — O99.013 ANEMIA AFFECTING PREGNANCY IN THIRD TRIMESTER: ICD-10-CM

## 2024-07-18 RX ORDER — FERROUS SULFATE 324(65)MG
324 TABLET, DELAYED RELEASE (ENTERIC COATED) ORAL DAILY
Qty: 90 TABLET | Refills: 1 | OUTPATIENT
Start: 2024-07-18 | End: 2024-10-16

## 2024-07-18 NOTE — TELEPHONE ENCOUNTER
Patient needs updated blood work. Please place orders. A courtesy refill was provided.       Please place orders for Fe (serum) and Ferritin and have pt complete prior to next refill (pt still has 2 30 day supplys at the pharmacy)

## 2024-07-19 LAB
DME PARACHUTE DELIVERY DATE ACTUAL: NORMAL
DME PARACHUTE DELIVERY DATE REQUESTED: NORMAL
DME PARACHUTE ITEM DESCRIPTION: NORMAL
DME PARACHUTE ORDER STATUS: NORMAL
DME PARACHUTE SUPPLIER NAME: NORMAL
DME PARACHUTE SUPPLIER PHONE: NORMAL

## 2024-07-25 ENCOUNTER — ROUTINE PRENATAL (OUTPATIENT)
Dept: OBGYN CLINIC | Facility: CLINIC | Age: 29
End: 2024-07-25

## 2024-07-25 VITALS
SYSTOLIC BLOOD PRESSURE: 110 MMHG | HEART RATE: 110 BPM | DIASTOLIC BLOOD PRESSURE: 68 MMHG | HEIGHT: 64 IN | WEIGHT: 166.6 LBS | BODY MASS INDEX: 28.44 KG/M2 | OXYGEN SATURATION: 98 %

## 2024-07-25 DIAGNOSIS — Z3A.36 36 WEEKS GESTATION OF PREGNANCY: ICD-10-CM

## 2024-07-25 DIAGNOSIS — O99.013 ANEMIA AFFECTING PREGNANCY IN THIRD TRIMESTER: Primary | ICD-10-CM

## 2024-07-25 PROCEDURE — PNV: Performed by: OBSTETRICS & GYNECOLOGY

## 2024-07-25 PROCEDURE — 87150 DNA/RNA AMPLIFIED PROBE: CPT | Performed by: OBSTETRICS & GYNECOLOGY

## 2024-07-25 NOTE — PROGRESS NOTES
"Assessment & Plan  29 y.o.  at 36w0d presenting for routine prenatal visit.       Problem List       No known health problems    Maternal varicella, non-immune    Overview     Varivax postpartum         36 weeks gestation of pregnancy    Overview     Declines MSAFP  Level II US 4/3: f/u as clinically indicated  28 week labs wnl  Delivery consent [X]  Tdap [X] 24  GBS [X]  Contraception [ ]  Would like to await spontaneous labor         Anemia affecting pregnancy in third trimester    Overview     Hgb 10.4  Taking oral iron          ____________________________________________________________  Subjective  She is without complaint.   She denies contractions, loss of fluid, or vaginal bleeding.   She feels regular fetal movements.       Objective  /68 (BP Location: Right arm, Patient Position: Sitting, Cuff Size: Standard)   Pulse (!) 110   Ht 5' 4\" (1.626 m)   Wt 75.6 kg (166 lb 9.6 oz)   LMP 2023 (Exact Date)   SpO2 98%   BMI 28.60 kg/m²   FHR: 150's via doppler    Physical Exam  Constitutional:       Appearance: She is well-developed.   Cardiovascular:      Rate and Rhythm: Normal rate and regular rhythm.      Heart sounds: Normal heart sounds. No murmur heard.     No friction rub. No gallop.   Pulmonary:      Effort: Pulmonary effort is normal. No respiratory distress.      Breath sounds: No wheezing.   Abdominal:      Palpations: Abdomen is soft.      Tenderness: There is no abdominal tenderness.   Musculoskeletal:         General: No tenderness.   Neurological:      Mental Status: She is alert and oriented to person, place, and time.   Vitals reviewed.          Patient's Active Problem List  Patient Active Problem List   Diagnosis    No known health problems    Maternal varicella, non-immune    36 weeks gestation of pregnancy    Anemia affecting pregnancy in third trimester           Jonah Pack MD  2024  9:57 AM          "

## 2024-07-27 LAB — GP B STREP DNA SPEC QL NAA+PROBE: POSITIVE

## 2024-08-05 ENCOUNTER — ROUTINE PRENATAL (OUTPATIENT)
Dept: OBGYN CLINIC | Facility: CLINIC | Age: 29
End: 2024-08-05

## 2024-08-05 VITALS
DIASTOLIC BLOOD PRESSURE: 82 MMHG | HEART RATE: 98 BPM | BODY MASS INDEX: 28.68 KG/M2 | HEIGHT: 64 IN | SYSTOLIC BLOOD PRESSURE: 118 MMHG | WEIGHT: 168 LBS

## 2024-08-05 DIAGNOSIS — Z3A.37 37 WEEKS GESTATION OF PREGNANCY: ICD-10-CM

## 2024-08-05 DIAGNOSIS — O99.820 GROUP B STREPTOCOCCAL CARRIAGE COMPLICATING PREGNANCY: ICD-10-CM

## 2024-08-05 DIAGNOSIS — O09.899 MATERNAL VARICELLA, NON-IMMUNE: ICD-10-CM

## 2024-08-05 DIAGNOSIS — O99.013 ANEMIA AFFECTING PREGNANCY IN THIRD TRIMESTER: Primary | ICD-10-CM

## 2024-08-05 DIAGNOSIS — Z28.39 MATERNAL VARICELLA, NON-IMMUNE: ICD-10-CM

## 2024-08-05 PROCEDURE — PNV: Performed by: OBSTETRICS & GYNECOLOGY

## 2024-08-05 NOTE — PROGRESS NOTES
Pt is a 29 y.o.  37w4d  Pregnancy is complicated by anemia, GBS positive, varicella non immune    Pt reports +FM. Denies vb, lof, ctx. Labor precautions  and fkc reviewed    GBS positive status reviewed with patient. Will plan PCN in labor.    Pt requests cervical examination. Unable to tolerate.    Pt has not had CBC yet, pt reminded to do so.

## 2024-08-11 ENCOUNTER — HOSPITAL ENCOUNTER (INPATIENT)
Facility: HOSPITAL | Age: 29
LOS: 2 days | Discharge: HOME | End: 2024-08-13
Attending: OBSTETRICS & GYNECOLOGY | Admitting: OBSTETRICS & GYNECOLOGY
Payer: COMMERCIAL

## 2024-08-11 ENCOUNTER — NURSE TRIAGE (OUTPATIENT)
Dept: OTHER | Facility: OTHER | Age: 29
End: 2024-08-11

## 2024-08-11 ENCOUNTER — ANESTHESIA EVENT (INPATIENT)
Dept: ANESTHESIOLOGY | Facility: HOSPITAL | Age: 29
End: 2024-08-11
Payer: COMMERCIAL

## 2024-08-11 ENCOUNTER — ANESTHESIA (INPATIENT)
Dept: ANESTHESIOLOGY | Facility: HOSPITAL | Age: 29
End: 2024-08-11
Payer: COMMERCIAL

## 2024-08-11 DIAGNOSIS — O13.3 GESTATIONAL HYPERTENSION, THIRD TRIMESTER: ICD-10-CM

## 2024-08-11 PROBLEM — O42.90 AMNIOTIC FLUID LEAKING: Status: ACTIVE | Noted: 2024-08-11

## 2024-08-11 PROBLEM — Z3A.38 38 WEEKS GESTATION OF PREGNANCY: Status: ACTIVE | Noted: 2024-08-11

## 2024-08-11 PROBLEM — M06.9 RHEUMATOID ARTHRITIS (HCC): Status: ACTIVE | Noted: 2024-08-11

## 2024-08-11 LAB
ABO GROUP BLD: NORMAL
BLD GP AB SCN SERPL QL: NEGATIVE
ERYTHROCYTE [DISTWIDTH] IN BLOOD BY AUTOMATED COUNT: 15.1 % (ref 11.6–15.1)
HCT VFR BLD AUTO: 34.4 % (ref 34.8–46.1)
HGB BLD-MCNC: 11.3 G/DL (ref 11.5–15.4)
HOLD SPECIMEN: NORMAL
MCH RBC QN AUTO: 27.8 PG (ref 26.8–34.3)
MCHC RBC AUTO-ENTMCNC: 32.8 G/DL (ref 31.4–37.4)
MCV RBC AUTO: 85 FL (ref 82–98)
PLATELET # BLD AUTO: 152 THOUSANDS/UL (ref 149–390)
PMV BLD AUTO: 12.5 FL (ref 8.9–12.7)
RBC # BLD AUTO: 4.07 MILLION/UL (ref 3.81–5.12)
RH BLD: POSITIVE
SPECIMEN EXPIRATION DATE: NORMAL
TREPONEMA PALLIDUM IGG+IGM AB [PRESENCE] IN SERUM OR PLASMA BY IMMUNOASSAY: NORMAL
WBC # BLD AUTO: 9.83 THOUSAND/UL (ref 4.31–10.16)

## 2024-08-11 PROCEDURE — 99202 OFFICE O/P NEW SF 15 MIN: CPT

## 2024-08-11 PROCEDURE — 86780 TREPONEMA PALLIDUM: CPT

## 2024-08-11 PROCEDURE — 86850 RBC ANTIBODY SCREEN: CPT

## 2024-08-11 PROCEDURE — 86900 BLOOD TYPING SEROLOGIC ABO: CPT

## 2024-08-11 PROCEDURE — NC001 PR NO CHARGE: Performed by: OBSTETRICS & GYNECOLOGY

## 2024-08-11 PROCEDURE — 4A1HXCZ MONITORING OF PRODUCTS OF CONCEPTION, CARDIAC RATE, EXTERNAL APPROACH: ICD-10-PCS | Performed by: OBSTETRICS & GYNECOLOGY

## 2024-08-11 PROCEDURE — 86901 BLOOD TYPING SEROLOGIC RH(D): CPT

## 2024-08-11 PROCEDURE — 85027 COMPLETE CBC AUTOMATED: CPT

## 2024-08-11 RX ORDER — OXYTOCIN/RINGER'S LACTATE 30/500 ML
1-30 PLASTIC BAG, INJECTION (ML) INTRAVENOUS
Status: DISCONTINUED | OUTPATIENT
Start: 2024-08-11 | End: 2024-08-12

## 2024-08-11 RX ORDER — ONDANSETRON 2 MG/ML
4 INJECTION INTRAMUSCULAR; INTRAVENOUS EVERY 6 HOURS PRN
Status: DISCONTINUED | OUTPATIENT
Start: 2024-08-11 | End: 2024-08-12

## 2024-08-11 RX ORDER — ACETAMINOPHEN 325 MG/1
975 TABLET ORAL EVERY 8 HOURS PRN
Status: DISCONTINUED | OUTPATIENT
Start: 2024-08-11 | End: 2024-08-12

## 2024-08-11 RX ORDER — CALCIUM CARBONATE 500 MG/1
500 TABLET, CHEWABLE ORAL DAILY PRN
Status: DISCONTINUED | OUTPATIENT
Start: 2024-08-11 | End: 2024-08-12

## 2024-08-11 RX ORDER — LIDOCAINE HYDROCHLORIDE AND EPINEPHRINE 15; 5 MG/ML; UG/ML
INJECTION, SOLUTION EPIDURAL AS NEEDED
Status: DISCONTINUED | OUTPATIENT
Start: 2024-08-11 | End: 2024-08-12 | Stop reason: HOSPADM

## 2024-08-11 RX ORDER — BUPIVACAINE HYDROCHLORIDE 2.5 MG/ML
30 INJECTION, SOLUTION EPIDURAL; INFILTRATION; INTRACAUDAL ONCE AS NEEDED
Status: DISCONTINUED | OUTPATIENT
Start: 2024-08-11 | End: 2024-08-12

## 2024-08-11 RX ORDER — SODIUM CHLORIDE, SODIUM LACTATE, POTASSIUM CHLORIDE, CALCIUM CHLORIDE 600; 310; 30; 20 MG/100ML; MG/100ML; MG/100ML; MG/100ML
125 INJECTION, SOLUTION INTRAVENOUS CONTINUOUS
Status: DISCONTINUED | OUTPATIENT
Start: 2024-08-11 | End: 2024-08-12

## 2024-08-11 RX ORDER — ROPIVACAINE HYDROCHLORIDE 2 MG/ML
INJECTION, SOLUTION EPIDURAL; INFILTRATION; PERINEURAL AS NEEDED
Status: DISCONTINUED | OUTPATIENT
Start: 2024-08-11 | End: 2024-08-12 | Stop reason: HOSPADM

## 2024-08-11 RX ORDER — OXYTOCIN/RINGER'S LACTATE 30/500 ML
PLASTIC BAG, INJECTION (ML) INTRAVENOUS
Status: COMPLETED
Start: 2024-08-11 | End: 2024-08-11

## 2024-08-11 RX ADMIN — Medication 2 MILLI-UNITS/MIN: at 23:16

## 2024-08-11 RX ADMIN — LIDOCAINE HYDROCHLORIDE AND EPINEPHRINE 3 ML: 15; 5 INJECTION, SOLUTION EPIDURAL at 17:53

## 2024-08-11 RX ADMIN — SODIUM CHLORIDE, SODIUM LACTATE, POTASSIUM CHLORIDE, AND CALCIUM CHLORIDE 125 ML/HR: .6; .31; .03; .02 INJECTION, SOLUTION INTRAVENOUS at 18:09

## 2024-08-11 RX ADMIN — SODIUM CHLORIDE 2.5 MILLION UNITS: 9 INJECTION, SOLUTION INTRAVENOUS at 22:49

## 2024-08-11 RX ADMIN — OXYTOCIN 2 MILLI-UNITS/MIN: 10 INJECTION INTRAVENOUS at 23:16

## 2024-08-11 RX ADMIN — SODIUM CHLORIDE, SODIUM LACTATE, POTASSIUM CHLORIDE, AND CALCIUM CHLORIDE 125 ML/HR: .6; .31; .03; .02 INJECTION, SOLUTION INTRAVENOUS at 12:41

## 2024-08-11 RX ADMIN — SODIUM CHLORIDE 2.5 MILLION UNITS: 9 INJECTION, SOLUTION INTRAVENOUS at 17:23

## 2024-08-11 RX ADMIN — SODIUM CHLORIDE 5 MILLION UNITS: 0.9 INJECTION, SOLUTION INTRAVENOUS at 12:42

## 2024-08-11 RX ADMIN — ROPIVACAINE HYDROCHLORIDE 10 ML/HR: 2 INJECTION EPIDURAL; INFILTRATION; PERINEURAL at 18:09

## 2024-08-11 RX ADMIN — SODIUM CHLORIDE, SODIUM LACTATE, POTASSIUM CHLORIDE, AND CALCIUM CHLORIDE 125 ML/HR: .6; .31; .03; .02 INJECTION, SOLUTION INTRAVENOUS at 21:42

## 2024-08-11 RX ADMIN — ROPIVACAINE HYDROCHLORIDE 10 ML: 2 INJECTION EPIDURAL; INFILTRATION; PERINEURAL at 18:00

## 2024-08-11 NOTE — H&P
H & P- Obstetrics   Simon Nguyễn 29 y.o. female MRN: [id number]  Unit/Bed#: LD TRIAGE 1- Encounter: [id number]      Assessment/Plan:    Simon is a 29 y.o.  at 38w3d admitted for labor  and spontaneous rupture of membranes   SVE: Cervical Dilation: 3  Cervical Effacement: 70  Cervical Consistency: Soft  Fetal Station: -3  Presentation: Vertex  Position: OA  Method: Manual  OB Examiner: Henny    38 weeks gestation of pregnancy  Assessment & Plan  Cephalic by ultrasound. Clinical EFW by [facility]: 6lbs  GBS positive status  Plan for expectant management  Analgesia and/or epidural at patient request  Follow up CBC, Syphilis screening, blood type & antibody screen  Method of contraception: undecided      Group B streptococcal carriage complicating pregnancy  Assessment & Plan  Plan for penicillin prophylaxis    Anemia affecting pregnancy in third trimester  Assessment & Plan  Follow up admission CBC    Maternal varicella, non-immune  Assessment & Plan  Offer varivax postpartum    * Amniotic fluid leaking  Assessment & Plan  Pooling on speculum exam  Nitrazine positive  Ferning positive          Patient of: [facility] (Dr. Manley, Dr. Pack, Dr. Howell)  This patient will be an INPATIENT  and I certify the anticipated length of stay is >2 Midnights  Discussed with Dr. Ann      SUBJECTIVE:    Chief Complaint: contractions and leaking fluid    HPI: Simon Nguyễn is a 29 y.o.  with an ROBBIE of 2024, by Last Menstrual Period at 38w3d who is being admitted for spontaneous rupture of membranes. She complains of uterine contractions, occurring every 5 minutes, has moderate LOF, and reports no VB. She states that the baby moves as usual.. This pregnancy is complicated by anemia and GBS positive status. All other review of systems is negative.       Pregnancy Plan:  Pregnancy: Cortes      Patient Active Problem List   Diagnosis    No known health problems    Maternal varicella, non-immune    37  weeks gestation of pregnancy    Anemia affecting pregnancy in third trimester    Group B streptococcal carriage complicating pregnancy    Amniotic fluid leaking       OB History    Para Term  AB Living   1             SAB IAB Ectopic Multiple Live Births                  # Outcome Date GA Lbr Mahesh/2nd Weight Sex Type Anes PTL Lv   1 Current                No past medical history on file.    No past surgical history on file.    Social History     Tobacco Use    Smoking status: Never    Smokeless tobacco: Never   Substance Use Topics    Alcohol use: Never       No Known Allergies      Medications Prior to Admission:     doxylamine (UNISOM) 25 MG tablet    ferrous sulfate 324 (65 Fe) mg    Prenatal Vit-Fe Fumarate-FA (PRENATAL VITAMINS PO)    Pyridoxine HCl (vitamin B-6) 25 MG tablet        OBJECTIVE:  Vitals:  HR:  [116] 116  Resp:  [18] 18  BP: (128)/(85) 128/85  There is no height or weight on file to calculate BMI.     Physical Exam:  General: Well appearing, no distress  Respiratory: Unlabored breathing  Cardiovascular: Regular rate.  Abdomen: Soft, gravid, nontender  Fundal Height: Appropriate for gestational age.  Extremities: Warm and well perfused.  Non tender.  Psychiatric: Behavioral normal        FHT:  Baseline Rate (FHR): 135 bpm  Variability: Moderate  Accelerations: 15 x 15 or greater  Decelerations: None    TOCO:   Contraction Frequency (minutes): 40-80  Contraction Duration (seconds): 60  Contraction Intensity: Moderate      Prenatal Labs: I have personally reviewed pertinent reports.  , Blood Type:   Lab Results   Component Value Date/Time    ABO Grouping A 2024 12:40 PM     , D (Rh type):   Lab Results   Component Value Date/Time    Rh Factor Positive 2024 12:40 PM     , Antibody Screen: Negative  , HCT/HGB:   Lab Results   Component Value Date/Time    Hematocrit 34.4 (L) 2024 12:40 PM    Hemoglobin 11.3 (L) 2024 12:40 PM      , MCV:   Lab Results   Component  "Value Date/Time    MCV 85 08/11/2024 12:40 PM      , Platelets:   Lab Results   Component Value Date/Time    Platelets 152 08/11/2024 12:40 PM      , 1 hour Glucola:   Lab Results   Component Value Date/Time    Glucose 108 05/29/2024 01:15 PM   , Varicella:   Lab Results   Component Value Date/Time    Varicella IgG NON-IMMUNE (A) 01/30/2024 11:10 AM       , Rubella:   Lab Results   Component Value Date/Time    Rubella IgG Quant 234.4 01/30/2024 11:10 AM        , VDRL/RPR: Non-reactive    , Urine Culture/Screen:   Lab Results   Component Value Date/Time    Urine Culture 30,000-39,000 cfu/ml Lactobacillus species (A) 01/30/2024 11:10 AM    Urine Culture [id number] cfu/ml 01/30/2024 11:10 AM       , Hep B:   Lab Results   Component Value Date/Time    Hepatitis B Surface Ag Non-reactive 01/30/2024 11:10 AM     , Hep C: Non-reactive  , HIV: Non-reactive  , Chlamydia: Negative   , Gonorrhea:   Lab Results   Component Value Date/Time    N gonorrhoeae, DNA Probe Negative 12/28/2023 01:37 PM     , Group B Strep:    Lab Results   Component Value Date/Time    Strep Grp B PCR Positive (A) 07/25/2024 10:03 AM            Judie Inman MD  8/11/2024  11:51 AM        Portions of the record may have been created with voice recognition software.  Occasional wrong word or \"sound a like\" substitutions may have occurred due to the inherent limitations of voice recognition software.  Read the chart carefully and recognize, using context, where substitutions have occurred    "

## 2024-08-11 NOTE — OB LABOR/OXYTOCIN SAFETY PROGRESS
Labor Progress Note - Simon Nguyễn 29 y.o. female MRN: [id number]    Unit/Bed#: -01 Encounter: [id number]       Contraction Frequency (minutes): 40-80  Contraction Intensity: Moderate  Uterine Activity Characteristics: Occasional  Cervical Dilation: 5        Cervical Effacement: 80  Fetal Station: -1  Baseline Rate (FHR): 135 bpm  Fetal Heart Rate (FHT): 150 BPM  FHR Category: I               Vital Signs:   Vitals:    08/11/24 1600   BP:    Pulse:    Resp:    Temp: (!) 97.2 °F (36.2 °C)       Notes/comments:   Patient feeling more uncomfortable. SVE as above. FHT cat I. She would like an epidural. Dr. Ann aware.      Judie Inman MD 8/11/2024 4:11 PM

## 2024-08-11 NOTE — TELEPHONE ENCOUNTER
"Reason for Disposition  • Leakage of fluid from vagina    Answer Assessment - Initial Assessment Questions  1. ONSET: \"When did the symptoms begin?\"         8/11 30 minutes ago      2. CONTRACTIONS: \"Are you having any contractions?\" If yes, ask: \"Describe the contractions that you are having.\" (e.g., duration, frequency, regularity, severity)      Do not have contractions    3. ROBBIE: \"What date are you expecting to deliver?\"      8/22    4. PARITY: \"Have you had a baby before?\" If Yes, ask: \"How long did the labor last?\"      No first baby    5. FETAL MOVEMENT: \"Has the baby's movement decreased or changed significantly from normal?\"      Good FM    6. OTHER SYMPTOMS: \"Do you have any other symptoms?\" (e.g., abdominal pain, vaginal bleeding, fever, hand/facial swelling)  No    Protocols used: Pregnancy - Rupture of Membranes-ADULT-AH    "

## 2024-08-11 NOTE — ASSESSMENT & PLAN NOTE
Cephalic by ultrasound. Clinical EFW by [facility]: 6lbs  GBS positive status  Plan for expectant management  Analgesia and/or epidural at patient request  Follow up CBC, Syphilis screening, blood type & antibody screen  Method of contraception: undecided

## 2024-08-11 NOTE — TELEPHONE ENCOUNTER
"Regardin weeks pregnant/water broke/contractions  ----- Message from Neeta ALCANTARA sent at 2024  9:13 AM EDT -----  \"I am 38 weeks pregnant and my water broke. I am getting contractions every 15 to 20 minutes\"    "

## 2024-08-11 NOTE — OB LABOR/OXYTOCIN SAFETY PROGRESS
Labor Progress Note - Simon Nguyễn 29 y.o. female MRN: [id number]    Unit/Bed#: -01 Encounter: [id number]       Contraction Frequency (minutes): 40-80  Contraction Intensity: Moderate  Uterine Activity Characteristics: Irregular  Cervical Dilation: 6        Cervical Effacement: 90  Fetal Station: -1  Baseline Rate (FHR): 125 bpm  Fetal Heart Rate (FHT): 150 BPM  FHR Category: 2               Vital Signs:   Vitals:    08/11/24 1806   BP: 118/80   Pulse: 97   Resp:    Temp:        Patient noted to have late deceleration after what appeared to be either long or back-to-back contractions. Decel with jorge to approx 100 bpm with spontaneous resolution. SVE with good change, as above and FHT currently with good accelerations and moderate variability. Continue with expectant management and to monitor closely. D/w Dr. Ann.     Mercedes Barlow MD 8/11/2024 6:48 PM

## 2024-08-11 NOTE — PLAN OF CARE

## 2024-08-11 NOTE — ANESTHESIA PREPROCEDURE EVALUATION
Procedure:  LABOR ANALGESIA    Relevant Problems   ANESTHESIA (within normal limits)      CARDIO (within normal limits)      ENDO (within normal limits)      GI/HEPATIC (within normal limits)      /RENAL (within normal limits)      GYN   (+) 38 weeks gestation of pregnancy      HEMATOLOGY   (+) Anemia affecting pregnancy in third trimester      MUSCULOSKELETAL (within normal limits)      NEURO/PSYCH (within normal limits)      PULMONARY (within normal limits)        Physical Exam    Airway    Mallampati score: III  TM Distance: >3 FB  Neck ROM: full     Dental   No notable dental hx     Cardiovascular  Rhythm: regular, Rate: normal, Cardiovascular exam normal    Pulmonary  Pulmonary exam normal Breath sounds clear to auscultation    Other Findings  post-pubertal.      Anesthesia Plan  ASA Score- 2     Anesthesia Type- epidural with ASA Monitors.         Additional Monitors:     Airway Plan:            Plan Factors-Exercise tolerance (METS): >4 METS.    Chart reviewed.   Existing labs reviewed. Patient summary reviewed.    Patient is not a current smoker.              Induction-     Postoperative Plan-     Perioperative Resuscitation Plan - Level 1 - Full Code.       Informed Consent- Anesthetic plan and risks discussed with patient and spouse.

## 2024-08-11 NOTE — ANESTHESIA PROCEDURE NOTES
Epidural Block    Patient location during procedure: OB/L&D  Start time: 8/11/2024 5:53 PM  Staffing  Performed by: Manjit Gallardo DO  Authorized by: Manjit Gallardo DO    Preanesthetic Checklist  Completed: patient identified, IV checked, site marked, risks and benefits discussed, surgical consent, monitors and equipment checked, pre-op evaluation and timeout performed  Epidural  Patient position: sitting  Prep: Betadine  Sedation Level: no sedation  Patient monitoring: heart rate and frequent blood pressure checks  Approach: midline  Location: lumbar, L3-4  Injection technique: HOANG air  Needle  Needle type: Tuohy   Needle gauge: 18 G  Needle insertion depth: 4.5 cm  Catheter type: side hole, multi-orifice and closed tip  Catheter size: 20 G  Catheter at skin depth: 8 cm  Catheter securement method: clear occlusive dressing, stabilization device and tape  Test dose: negative  Assessment  Number of attempts: 2negative aspiration for CSF, negative aspiration for heme and no paresthesia on injection  patient tolerated the procedure well with no immediate complications

## 2024-08-12 LAB
BASE EXCESS BLDCOA CALC-SCNC: -7.9 MMOL/L (ref 3–11)
BASE EXCESS BLDCOV CALC-SCNC: -5.9 MMOL/L (ref 1–9)
HCO3 BLDCOA-SCNC: 21.3 MMOL/L (ref 17.3–27.3)
HCO3 BLDCOV-SCNC: 20 MMOL/L (ref 12.2–28.6)
O2 CT VFR BLDCOA CALC: 11.2 ML/DL
OXYHGB MFR BLDCOA: 45.1 %
OXYHGB MFR BLDCOV: 65.3 %
PCO2 BLDCOA: 57.1 MM[HG] (ref 30–60)
PCO2 BLDCOV: 40.9 MM HG (ref 27–43)
PH BLDCOA: 7.19 [PH] (ref 7.23–7.43)
PH BLDCOV: 7.31 [PH] (ref 7.19–7.49)
PO2 BLDCOA: 22.9 MM HG (ref 5–25)
PO2 BLDCOV: 29.2 MM HG (ref 15–45)
SAO2 % BLDCOV: 16.2 ML/DL

## 2024-08-12 PROCEDURE — 82805 BLOOD GASES W/O2 SATURATION: CPT | Performed by: OBSTETRICS & GYNECOLOGY

## 2024-08-12 PROCEDURE — 59400 OBSTETRICAL CARE: CPT | Performed by: OBSTETRICS & GYNECOLOGY

## 2024-08-12 PROCEDURE — 0KQM0ZZ REPAIR PERINEUM MUSCLE, OPEN APPROACH: ICD-10-PCS | Performed by: OBSTETRICS & GYNECOLOGY

## 2024-08-12 RX ORDER — ACETAMINOPHEN 325 MG/1
650 TABLET ORAL EVERY 6 HOURS SCHEDULED
Status: DISCONTINUED | OUTPATIENT
Start: 2024-08-12 | End: 2024-08-13 | Stop reason: HOSPADM

## 2024-08-12 RX ORDER — CALCIUM CARBONATE 500 MG/1
1000 TABLET, CHEWABLE ORAL 3 TIMES DAILY PRN
Status: DISCONTINUED | OUTPATIENT
Start: 2024-08-12 | End: 2024-08-13 | Stop reason: HOSPADM

## 2024-08-12 RX ORDER — ONDANSETRON 2 MG/ML
4 INJECTION INTRAMUSCULAR; INTRAVENOUS EVERY 8 HOURS PRN
Status: DISCONTINUED | OUTPATIENT
Start: 2024-08-12 | End: 2024-08-13 | Stop reason: HOSPADM

## 2024-08-12 RX ORDER — BENZOCAINE/MENTHOL 6 MG-10 MG
1 LOZENGE MUCOUS MEMBRANE DAILY PRN
Status: DISCONTINUED | OUTPATIENT
Start: 2024-08-12 | End: 2024-08-13 | Stop reason: HOSPADM

## 2024-08-12 RX ORDER — OXYTOCIN/RINGER'S LACTATE 30/500 ML
250 PLASTIC BAG, INJECTION (ML) INTRAVENOUS CONTINUOUS
Status: ACTIVE | OUTPATIENT
Start: 2024-08-12 | End: 2024-08-12

## 2024-08-12 RX ORDER — DIPHENHYDRAMINE HCL 25 MG
25 TABLET ORAL EVERY 6 HOURS PRN
Status: DISCONTINUED | OUTPATIENT
Start: 2024-08-12 | End: 2024-08-13 | Stop reason: HOSPADM

## 2024-08-12 RX ORDER — MAGNESIUM HYDROXIDE/ALUMINUM HYDROXICE/SIMETHICONE 120; 1200; 1200 MG/30ML; MG/30ML; MG/30ML
15 SUSPENSION ORAL EVERY 6 HOURS PRN
Status: DISCONTINUED | OUTPATIENT
Start: 2024-08-12 | End: 2024-08-13 | Stop reason: HOSPADM

## 2024-08-12 RX ORDER — SENNOSIDES 8.6 MG
1 TABLET ORAL DAILY
Status: DISCONTINUED | OUTPATIENT
Start: 2024-08-12 | End: 2024-08-13 | Stop reason: HOSPADM

## 2024-08-12 RX ORDER — SIMETHICONE 80 MG
80 TABLET,CHEWABLE ORAL 4 TIMES DAILY PRN
Status: DISCONTINUED | OUTPATIENT
Start: 2024-08-12 | End: 2024-08-13 | Stop reason: HOSPADM

## 2024-08-12 RX ORDER — IBUPROFEN 600 MG/1
600 TABLET, FILM COATED ORAL EVERY 6 HOURS SCHEDULED
Status: DISCONTINUED | OUTPATIENT
Start: 2024-08-12 | End: 2024-08-13 | Stop reason: HOSPADM

## 2024-08-12 RX ORDER — DOCUSATE SODIUM 100 MG/1
100 CAPSULE, LIQUID FILLED ORAL 2 TIMES DAILY
Status: DISCONTINUED | OUTPATIENT
Start: 2024-08-12 | End: 2024-08-13 | Stop reason: HOSPADM

## 2024-08-12 RX ADMIN — ACETAMINOPHEN 650 MG: 325 TABLET, FILM COATED ORAL at 15:38

## 2024-08-12 RX ADMIN — ACETAMINOPHEN 650 MG: 325 TABLET, FILM COATED ORAL at 08:11

## 2024-08-12 RX ADMIN — ACETAMINOPHEN 650 MG: 325 TABLET, FILM COATED ORAL at 21:03

## 2024-08-12 RX ADMIN — IBUPROFEN 600 MG: 600 TABLET, FILM COATED ORAL at 21:03

## 2024-08-12 RX ADMIN — IBUPROFEN 600 MG: 600 TABLET, FILM COATED ORAL at 02:30

## 2024-08-12 RX ADMIN — SENNOSIDES 8.6 MG: 8.6 TABLET, FILM COATED ORAL at 08:11

## 2024-08-12 RX ADMIN — WITCH HAZEL 1 PAD: 500 SOLUTION RECTAL; TOPICAL at 02:31

## 2024-08-12 RX ADMIN — DOCUSATE SODIUM 100 MG: 100 CAPSULE, LIQUID FILLED ORAL at 17:52

## 2024-08-12 RX ADMIN — DOCUSATE SODIUM 100 MG: 100 CAPSULE, LIQUID FILLED ORAL at 08:11

## 2024-08-12 RX ADMIN — BENZOCAINE AND LEVOMENTHOL 1 APPLICATION: 200; 5 SPRAY TOPICAL at 02:31

## 2024-08-12 RX ADMIN — IBUPROFEN 600 MG: 600 TABLET, FILM COATED ORAL at 08:11

## 2024-08-12 RX ADMIN — IBUPROFEN 600 MG: 600 TABLET, FILM COATED ORAL at 15:37

## 2024-08-12 RX ADMIN — ACETAMINOPHEN 650 MG: 325 TABLET, FILM COATED ORAL at 02:30

## 2024-08-12 NOTE — OB LABOR/OXYTOCIN SAFETY PROGRESS
Labor Progress Note - Simon gNuyễn 29 y.o. female MRN: [id number]    Unit/Bed#: -01 Encounter: [id number]       Contraction Frequency (minutes): difficult to trace  Contraction Intensity: Moderate  Uterine Activity Characteristics: Irregular  Cervical Dilation: 10  Dilation Complete Date: 08/11/24  Dilation Complete Time: 2054  Cervical Effacement: 100  Fetal Station: 2  Baseline Rate (FHR): 130 bpm  Fetal Heart Rate (FHT): 150 BPM  FHR Category: II               Vital Signs:   Vitals:    08/11/24 2250   BP: 139/77   Pulse: 95   Resp:    Temp:        Notes/comments:   Pt pushing since about 2130. +2 station, TONY on exam. Pushing with good effort. Contractions are about 3-5 mins apart, will start pitocin. FHT with variable decelerations with pushing but moderate variability and reassuring features overall.       Angelic Ann MD 8/11/2024 11:15 PM

## 2024-08-12 NOTE — ANESTHESIA POSTPROCEDURE EVALUATION
"Post-Op Assessment Note    CV Status:  Stable    Pain management: adequate      Post-op block assessment: catheter intact and no complications   Mental Status:  Alert and awake   Hydration Status:  Euvolemic   PONV Controlled:  Controlled   Airway Patency:  Patent     Post Op Vitals Reviewed: Yes    No anethesia notable event occurred.    Staff: Anesthesiologist               BP      Temp      Pulse     Resp      SpO2      /90   Pulse 104   Temp 98 °F (36.7 °C) (Oral)   Resp 18   Ht 5' 4\" (1.626 m)   Wt 76.2 kg (168 lb)   LMP 11/16/2023 (Exact Date)   Breastfeeding Yes   BMI 28.84 kg/m²     "

## 2024-08-12 NOTE — LACTATION NOTE
This note was copied from a baby's chart.  CONSULT - LACTATION  Baby Boy (Simon) Delma 0 days male MRN: [id number]    [facility] Room / Bed: (N)/(N) Encounter: [id number]    Maternal Information     MOTHER:  Simon Nguyễn  Maternal Age: 29 y.o.  OB History: # 1 - Date: 24, Sex: Male, Weight: 3280 g (7 lb 3.7 oz), GA: 38w4d, Type: Vaginal, Spontaneous, Apgar1: 9, Apgar5: 9, Living: Living, Birth Comments: None   Previouse breast reduction surgery? No    Lactation history:   Has patient previously breast fed: No   How long had patient previously breast fed:     Previous breast feeding complications:     No past surgical history on file.    Birth information:  YOB: 2023   Time of birth: 12:14 AM   Sex: male   Delivery type: Vaginal, Spontaneous   Birth Weight: 3280 g (7 lb 3.7 oz)   Percent of Weight Change: 0%     Gestational Age: 38w4d   [unfilled]    Assessment     Breast and nipple assessment: flat nipple    [place]ster Assessment: sleepy    Feeding assessment: latch difficulty (dyad nursed with latch assist )    LATCH:  Latch: Repeated attempts, hold nipple in mouth, stimulate to suck   Audible Swallowing: A few with stimulation   Type of Nipple: Flat   Comfort (Breast/Nipple): Soft/non-tender   Hold (Positioning): Partial assist, teach one side, mother does other, staff holds   LATCH Score: 6          Feeding recommendations:  breast feed on demand    Met with Family. Provided  with Ready, Set, Baby booklet which contained information on:  Hand expression with access to QR codes to review hand expression.  Positioning and latch reviewed as well as showing images of other feeding positions.  Discussed the properties of a good latch in any position.   Feeding on cue and what that means for recognizing infant's hunger, s/s that baby is getting enough milk and some s/s that breastfeeding dyad may need further help  Skin to Skin contact and  benefits to mom and baby  Avoidance of pacifiers for the first month discussed.   Gave information on common concerns, what to expect the first few weeks after delivery, preparing for other caregivers, and how partners can help. Resources for support also provided.    Education on positioning and alignment. Mom is encouraged to:     - Bring baby up to the breast (use of pillows to elevate so baby's torso is against mom's breasts)   - Skin to skin for feedings with top hand exposed to show signs of satiation   - Chin deep into breast tissue (make baby look up to the nipple)   - nose aligned to the nipple   -Wait for wide gape, drag chin on the breast so nipple is aimed at the upper, back palate  - Cheek should be touching breast   - Deep, firm hold of baby with ear, shoulder, hip alignment    Mom chose to start on left breast. Encouraged football hold for large breasts with flat nipples. With permission; Worked on positioning infant up at chest level and starting to feed infant with nose arriving at the nipple. Then, using areolar compression to achieve a deep latch that is comfortable and exchanges optimum amounts of milk. Baby had short latches with mostly piston sucks. Then right breast with more difficulty with sustained latch. Did better with time. Nursing parent  was able to note signs of a good feeding like: less discomfort after latch on tenderness, good rocker suckling with stimulation, breast softening; rounded nipple and relaxed tone after nursing at breast. Family also shown signs of good latch /feed.     Also reviewed discharge breastfeeding booklet including the feeding log. Emphasized 8 or more (12) feedings in a 24 hour period, what to expect for the number of diapers per day of life and the progression of properties of the  stooling pattern.    List of reasons to call a lactation consultant.  Feeding logs  Feeding cues  Hand expression  Baby's Second day (cluster feeding)  Breastfeeding and  Your Lifestyle (Medications, Alcohol, Caffeine, Smoking, Street Drugs, Methadone)  First Two Weeks Survival Guide for Breastfeeding  Breast Changes  Physical Therapy  Storage and Handling of Breast milk  How to Keep Your Breast Pump Kit Clean  The Employed Breastfeeding Mother  Mixed feeding  Bottle feeding like breastfeeding (paced bottle feeding)  astfeeding and your lifestyle, storage and preparation of breast milk, how to keep you breast pump clean, the employed breastfeeding mother and paced bottle feeding handouts.     Booklet included Breastfeeding Resources for after discharge including access to the number for the [facility].    Encouraged parents to call for assistance, questions, and concerns about breastfeeding.  Extension provided.            Jordyn Caldwell RN 8/12/2024 5:53 PM

## 2024-08-12 NOTE — OB LABOR/OXYTOCIN SAFETY PROGRESS
Labor Progress Note - Simon Nguyễn 29 y.o. female MRN: [id number]    Unit/Bed#: -01 Encounter: [id number]       Contraction Frequency (minutes): difficult to trace  Contraction Intensity: Moderate  Uterine Activity Characteristics: Irregular  Cervical Dilation: 10  Dilation Complete Date: 08/11/24  Dilation Complete Time: 2054  Cervical Effacement: 100  Fetal Station: 0  Baseline Rate (FHR): 130 bpm  Fetal Heart Rate (FHT): 150 BPM  FHR Category: 1               Vital Signs:   Vitals:    08/11/24 1907   BP: 121/84   Pulse: 98   Resp:    Temp:        Patient continues to push with good maternal effort. FHT category 1. Continue to monitor closely while pushing.    Mercedes Barlow MD 8/11/2024 10:17 PM

## 2024-08-12 NOTE — L&D DELIVERY NOTE
Vaginal Delivery Summary - OB/GYN   Simon Nguyễn 29 y.o. female MRN: [id number]  Unit/Bed#: -01 Encounter: [id number]    Pre-delivery Diagnosis:   1. Pregnancy at 38w4d   2. GBS+  3. Iron deficiency anemia  4. Varicella non-immune    Post-delivery Diagnosis: same, delivered    Procedure: Spontaneous Vaginal Delivery     Attending: Dr. Ann    Assistant(s): Dr. Barlow    Anesthesia: Epidural    QBL: 230 mL    Complications: none apparent    Specimens:   1. Arterial and venous cord gases  2. Cord blood  3. Segment of umbilical cord  4. Placenta to storage     Findings:  1. Viable male on 2024 at 0014, with APGARS of 9 and 9 at 1 and 5 minutes respectively,  2. Spontaneous delivery of intact placenta at 0019  3. Second degree laceration repaired with 3-0 vicryl rapide  4. Umbilical Cord Venous Blood Gas:  Results from last 7 days   Lab Units 24  0015   PH COV  7.308   PCO2 COV mm HG 40.9   HCO3 COV mmol/L 20.0   BASE EXC COV mmol/L -5.9*   O2 CT CD VB mL/dL 16.2   O2 HGB, VENOUS CORD % 65.3     5. Umbilical Cord Arterial Blood Gas:  Results from last 7 days   Lab Units 24  0015   PH COA  7.189*   PCO2 COA  57.1   PO2 COA mm HG 22.9   HCO3 COA mmol/L 21.3   BASE EXC COA mmol/L -7.9*   O2 CONTENT CORD ART ml/dl 11.2   O2 HGB, ARTERIAL CORD % 45.1     Disposition:  Patient tolerated the procedure well and was recovering in labor and delivery room.    Brief history and labor course:  Simon Nguyễn is a 29 y.o. now  at 38w4d wks who was initially admitted for spontaneous rupture of membranes. Her pregnancy was complicated by iron deficiency anemia; her admission HgB was 11.3 g/dL. On admission, she was noted to be grossly ruptured and SVE was 3/70/-3. Penicillin was started for GBS prophylaxis. She received an epidural. She progressed to complete and began pushing. While pushing, her contractions were noted to space out and pitocin titration was started.    Description of procedure:  After  pushing for 2 hours and 40 minutes, at 0014 patient delivered a viable male , wt pending, apgars of 9 (1 min) and 9 (5 min). The fetal vertex delivered spontaneously. There was no nuchal cord. Baby was OA, restituted to TONY. The right anterior shoulder delivered atraumatically with maternal expulsive forces and the assistance of gentle downward traction. The left posterior shoulder delivered with maternal expulsive forces and the assistance of gentle upward traction. The remainder of the fetus delivered spontaneously.     Upon delivery, the infant was placed on the mothers abdomen and the cord was clamped and cut. The infant was noted to cry spontaneously and was moving all extremities appropriately. There was no evidence for injury. Awaiting nurse resuscitators evaluated the . Arterial and venous cord blood gases and cord blood was collected for analysis. These were promptly sent to the lab. In the immediate post-partum, 30 units of IV pitocin was administered, and the uterus was noted to contract down well with massage and pitocin. The placenta delivered spontaneously at 0019 and was noted to have a centrally inserted 3 vessel cord.     The vagina, cervix, perineum, and rectum were inspected and there was noted to be a second degree laceration requiring repair. Under adequate anesthesia, the apex of the vaginal laceration was identified and an anchoring suture was placed 1 cm above the apex.  The vaginal mucosa and underlying rectovaginal fascia were closed using a running locked 3-0 Vicryl-CT 1 suture to the hymenal ring.  The suture was then brought underneath the hymenal ring.  The suture was then placed through the bulbocavernosus muscle. Continuing with the same suture, the transverse perineal muscles were reapproximated with 2 transverse running sutures.  The suture was brought to the posterior apex of the skin laceration and then the skin was reapproximated in a subcuticular fashion to the  hymenal ring. Excellent hemostasis was achieved.     At the conclusion of the procedure, all needle, sponge, and instrument counts were noted to be correct. Patient tolerated the procedure well and was allowed to recover in labor and delivery room with family and  before being transferred to the post-partum floor. Dr. Ann was present and participated in all key portions of the case.    Mercedes Barlow MD  OB/GYN PGY-2  2024  12:45 AM

## 2024-08-12 NOTE — OB LABOR/OXYTOCIN SAFETY PROGRESS
Labor Progress Note - Simon Nguyễn 29 y.o. female MRN: [id number]    Unit/Bed#: -01 Encounter: [id number]       Contraction Frequency (minutes): difficult to trace  Contraction Intensity: Moderate  Uterine Activity Characteristics: Irregular  Cervical Dilation: 10  Dilation Complete Date: 08/11/24  Dilation Complete Time: 2054  Cervical Effacement: 100  Fetal Station: 0  Baseline Rate (FHR): 130 bpm  Fetal Heart Rate (FHT): 150 BPM  FHR Category: 1               Vital Signs:   Vitals:    08/11/24 1907   BP: 121/84   Pulse: 98   Resp:    Temp:      Patient feeling lots of pressure, SVE now complete as above. Plan to start pushing shortly. D/W Dr. Ann.    Mercedes Barlow MD 8/11/2024 8:55 PM

## 2024-08-12 NOTE — DISCHARGE SUMMARY
Discharge Summary - OB/GYN   Simon Nguyễn 29 y.o. female MRN: [id number]  Unit/Bed#: -01 Encounter: [id number]      Admission Date: 2024     Discharge Date: 24     Admitting Diagnosis:   Patient Active Problem List   Diagnosis    No known health problems    Maternal varicella, non-immune    37 weeks gestation of pregnancy    Anemia affecting pregnancy in third trimester    Group B streptococcal carriage complicating pregnancy    Amniotic fluid leaking    38 weeks gestation of pregnancy        Discharge Diagnosis:   Same, delivered  Gestational HTN    Procedures: spontaneous vaginal delivery    Delivery Attending: Angelic Ann MD  Discharge Attending: Charlotte Hunger[facility] Hospital Course:   Simon Nguyễn is a 29 y.o. now  at 38w4d wks who was initially admitted for spontaneous rupture of membranes. Her pregnancy was complicated by iron deficiency anemia; her admission HgB was 11.3 g/dL. On admission, she was noted to be grossly ruptured and SVE was 3/70/-3. Penicillin was started for GBS prophylaxis. She received an epidural. She progressed to complete and began pushing. While pushing, her contractions were noted to space out and pitocin titration was started.    She delivered a viable male  on 24 at 0014. Weight 7lbs 3.7oz via spontaneous vaginal delivery. Apgars were 9 (1 min) and 9 (5 min).  was transferred to  nursery. Patient tolerated the procedure well and was transferred to recovery in stable condition.     Her postpartum course was uncomplicated. . Her postpartum pain was well controlled with oral analgesics.    On day of discharge, she was ambulating and able to reasonably perform all ADLs. She was voiding and had appropriate bowel function. Pain was well controlled. She was discharged home on post-partum day #1 without complications. Patient was instructed to follow up with her OB as an outpatient and was given appropriate warnings to call provider if she develops  signs of infection or uncontrolled pain.    Complications: none apparent    Condition at discharge: good     Discharge instructions/Information to patient and family:   - Do not place anything (no partner, tampons or douche) in your vagina for 6 weeks.  -You may walk for exercise for the first 6 weeks then gradually return to your usual activities.   -Please do not drive for 1 week if you have no stitches and for 2 weeks if you have stitches or underwent a  delivery.    -You may take baths or shower per your preference.   -Please look at your bust (breasts) in the mirror daily and call for redness or tenderness or increased warmth.   -Please call us for temperature > 100.4*F or 38* C, worsening pain or a foul discharge.      Discharge Medications:   Prenatal vitamin daily for 6 months or the duration of nursing whichever is longer.  Motrin 600 mg orally every 6 hours as needed for pain  Tylenol (over the counter) per bottle directions as needed for pain: do NOT use with percocet  Hydrocortisone cream 1% (over the counter) applied 1-2x daily to hemorrhoids as needed  Home BP cuff with advice to follow up in 2-3 days and self-monitor    Planned Readmission: No

## 2024-08-13 ENCOUNTER — LACTATION ENCOUNTER (OUTPATIENT)
Dept: NURSERY | Facility: HOSPITAL | Age: 29
End: 2024-08-13

## 2024-08-13 VITALS
RESPIRATION RATE: 18 BRPM | BODY MASS INDEX: 28.68 KG/M2 | OXYGEN SATURATION: 98 % | HEART RATE: 91 BPM | HEIGHT: 64 IN | TEMPERATURE: 97.5 F | SYSTOLIC BLOOD PRESSURE: 124 MMHG | WEIGHT: 168 LBS | DIASTOLIC BLOOD PRESSURE: 89 MMHG

## 2024-08-13 PROBLEM — O13.9 GESTATIONAL HYPERTENSION: Status: ACTIVE | Noted: 2024-08-13

## 2024-08-13 PROCEDURE — NC001 PR NO CHARGE: Performed by: OBSTETRICS & GYNECOLOGY

## 2024-08-13 PROCEDURE — 99024 POSTOP FOLLOW-UP VISIT: CPT | Performed by: OBSTETRICS & GYNECOLOGY

## 2024-08-13 RX ORDER — CALCIUM CARBONATE 500 MG/1
1000 TABLET, CHEWABLE ORAL 3 TIMES DAILY PRN
Start: 2024-08-13

## 2024-08-13 RX ORDER — MAGNESIUM HYDROXIDE/ALUMINUM HYDROXICE/SIMETHICONE 120; 1200; 1200 MG/30ML; MG/30ML; MG/30ML
15 SUSPENSION ORAL EVERY 6 HOURS PRN
Start: 2024-08-13

## 2024-08-13 RX ORDER — BENZOCAINE/MENTHOL 6 MG-10 MG
1 LOZENGE MUCOUS MEMBRANE DAILY PRN
Start: 2024-08-13

## 2024-08-13 RX ORDER — DIPHENHYDRAMINE HCL 25 MG
25 TABLET ORAL EVERY 6 HOURS PRN
Status: CANCELLED
Start: 2024-08-13

## 2024-08-13 RX ORDER — IBUPROFEN 200 MG
600 TABLET ORAL EVERY 6 HOURS SCHEDULED
Start: 2024-08-13

## 2024-08-13 RX ORDER — SENNOSIDES 8.6 MG
8.6 TABLET ORAL DAILY
Start: 2024-08-13

## 2024-08-13 RX ORDER — SIMETHICONE 80 MG
80 TABLET,CHEWABLE ORAL 4 TIMES DAILY PRN
Status: CANCELLED
Start: 2024-08-13

## 2024-08-13 RX ORDER — DOCUSATE SODIUM 100 MG/1
100 CAPSULE, LIQUID FILLED ORAL 2 TIMES DAILY
Status: CANCELLED
Start: 2024-08-13

## 2024-08-13 RX ORDER — ACETAMINOPHEN 325 MG/1
650 TABLET ORAL EVERY 6 HOURS SCHEDULED
Start: 2024-08-13

## 2024-08-13 NOTE — ASSESSMENT & PLAN NOTE
Admission CBC wnl  2 elevated BP > 4 hours apart  Continue to monitor BP, signs/symptoms    Systolic (12hrs), Av , Min:124 , Max:132   Diastolic (12hrs), Av, Min:86, Max:89

## 2024-08-13 NOTE — NURSING NOTE
Maternal and  education completed. All questions reviewed and answered. Patient is encouraged to reach out to nurse if any more questions arise. Maternal and  teaching pamphlets and Save Your Life Magnet given and reviewed with patient.

## 2024-08-13 NOTE — PROGRESS NOTES
Progress Note - OB/GYN   Simon Nguyễn 29 y.o. female MRN: [id number]  Unit/Bed#: -01 Encounter: [id number]      Assessment/Plan    Simon Nguyễn is a 29 y.o.  who is PPD 1 s/p  at 38w4d     Gestational hypertension  Assessment & Plan  Admission CBC wnl  2 elevated BP > 4 hours apart  Continue to monitor BP, signs/symptoms    Systolic (12hrs), Av , Min:124 , Max:132   Diastolic (12hrs), Av, Min:86, Max:89        Anemia affecting pregnancy in third trimester  Assessment & Plan  Admission Hgb 11.3    Maternal varicella, non-immune  Assessment & Plan  Declined varivax postpartum     *  (spontaneous vaginal delivery)  Assessment & Plan  Continue routine post partum care  Encourage ambulation  Encourage breastfeeding  Contraception: undecided  Anticipate discharge PPD 1-2            Disposition: Anticipate discharge home postpartum Day #1-2  Barriers to discharge: none       Subjective/Objective     Subjective: Postpartum state. Denies headache, RUQ pain/epigastric pain, visual changes, SOB, increased edema.     Pain: no  Tolerating PO: yes  Voiding: yes  Flatus: yes  BM: no  Ambulating: yes  Breastfeeding: Breastfeeding  Chest pain: no  Shortness of breath: no  Leg pain: no  Lochia: wnl    Objective:     Vitals:  Vitals:    24 1100 24 1522 24 1922 24 2300   BP: 129/83 118/74 124/86 132/89   BP Location:   Left arm Left arm   Pulse: 104 96 96 88   Resp: 20 16 18 18   Temp: 97.9 °F (36.6 °C) 97.9 °F (36.6 °C) 98.9 °F (37.2 °C) 98 °F (36.7 °C)   TempSrc: Temporal Temporal Oral Oral   SpO2: 98% 98% 100% 98%   Weight:       Height:           Physical Exam:   GEN: appears well, alert and oriented x 3, pleasant and cooperative   CV: Regular rate  RESP: non labored breathing  ABDOMEN: soft, no tenderness, no distention, Uterine fundus firm and non-tender, below the umbilicus  EXTREMITIES: non-tender  NEURO Alert and oriented to person, place, and time.       Lab Results   Component  Value Date    WBC 9.83 08/11/2024    HGB 11.3 (L) 08/11/2024    HCT 34.4 (L) 08/11/2024    MCV 85 08/11/2024     08/11/2024         Chanel Pierce MD  Obstetrics & Gynecology PGY-1  08/13/24

## 2024-08-13 NOTE — LACTATION NOTE
This note was copied from a baby's chart.    In to see family today. Despite the times charted for feeds. Mom states baby has not been staying latches since our feed yesterday. Mom was started  pumping overnight. Instructions reviewed for hands on pumping.  Discussed when to start, frequency, different pumps available versus manual expression.    Discussed hygiene of hands and supplies as well as assembly, placement of flanges, size of flanged, preparing the breast and cycles and suction settings on pump.    Demonstrated use of hand pump.    Discussed labeling of milk, storage, and preparation of stored milk.    Review of  positioning and alignment. Mom is encouraged to:     - Bring baby up to the breast (use of pillows to elevate so baby's torso is against mom's breasts)   - Skin to skin for feedings with top hand exposed to show signs of satiation   - Chin deep into breast tissue (make baby look up to the nipple)   - nose aligned to the nipple   -Wait for wide gape, drag chin on the breast so nipple is aimed at the upper, back palate  - Cheek should be touching breast   - Deep, firm hold of baby with ear, shoulder, hip alignment    On left using football hold. Worked on positioning infant up at chest level and starting to feed infant with nose arriving at the nipple. Then, using areolar compression to achieve a deep latch that is comfortable and exchanges optimum amounts of milk. Baby NOT latching well today. Baby to right breast with Nipple shield for a 5 minute feed. Had 8ml of expressed milk for after latch attempt. Decided to use Nipple shield for feeding plan at home with pumping and slow flow nipples for expressed milk after feeds. . Follow up ASAP with [facility] Me.       08/13/24 1030   Maternal Information   Has mother  before? No   Infant to breast within first hour of birth? No   Breastfeeding Delayed Due to Infant status   Exclusive Pump and Bottle Feed No   LATCH Documentation   Latch 1    Audible Swallowing 1   Type of Nipple 1   Comfort (Breast/Nipple) 2   Hold (Positioning) 1   LATCH Score 6   Having latch problems? Yes  (nipple shields)   Position(s) Used Football   Breasts/Nipples   Date Pumping Initiated 08/12/24   Left Breast Soft   Right Breast Soft   Left Nipple Flat   Right Nipple Inverted   Intervention Other (comment);Hand expression;Nipple shield   Breastfeeding Progress Not yet established   Reasons for not Breastfeeding   (infant latch)   Other OB Lactation Tools   Feeding Devices Pump;Syringe;Bottle   Breast Pump   Pump 2;1;3  (has Medela)   Pump Review/Education Milk storage   Patient Follow-Up   Lactation Consult Status 2   Follow-Up Type Inpatient;Call as needed   Other OB Lactation Documentation    Additional Problem Noted helped with latch attempt; then 2nd breast with Nipple shield  (LER - Nipple shield)     Encouraged parents to call for assistance, questions, and concerns about breastfeeding.  Extension provided.

## 2024-08-13 NOTE — ASSESSMENT & PLAN NOTE
Continue routine post partum care  Encourage ambulation  Encourage breastfeeding  Contraception: undecided  Anticipate discharge PPD 1-2

## 2024-08-13 NOTE — DISCHARGE INSTR - AVS FIRST PAGE
Please check your blood pressure 2x/ day and call your doctor if the top number is 140 or greater, or if the bottom number is 90 or greater.

## 2024-08-16 ENCOUNTER — TELEPHONE (OUTPATIENT)
Dept: OBGYN CLINIC | Facility: CLINIC | Age: 29
End: 2024-08-16

## 2024-08-16 NOTE — TELEPHONE ENCOUNTER
POSTPARTUM PHONE CALL ASSESSMENT    Date of Delivery: 2024  Delivering Provider: Dr. Ulloa   Mode:   Delivery Notes/Complications: No complications   Do you still have bleeding? yes  If so, how much/how severe? light bleeding  Do you have any pain? no  If so, how much/how severe? denies pain  Regular BMs/Urination? yes  Breastfeeding/Formula/Both? Breastfeeding  How are you doing emotionally? Patient states she is doing well.    EPDS Score: 0  Do you have any other questions or concerns for us or your provider? no   Have you scheduled the pediatrician appointment with pediatrician? yes  Do you have a postpartum visit scheduled? yes   Date scheduled: 9/3/2024 Provider:  Dr. Pack

## 2024-08-16 NOTE — TELEPHONE ENCOUNTER
Called patient for postpartum follow up call.  Left message on voicemail to return call.  [facility] and score was 0.

## 2024-08-20 LAB — PLACENTA IN STORAGE: NORMAL

## 2024-08-26 ENCOUNTER — OFFICE VISIT (OUTPATIENT)
Dept: POSTPARTUM | Facility: CLINIC | Age: 29
End: 2024-08-26
Payer: COMMERCIAL

## 2024-08-26 PROCEDURE — 99214 OFFICE O/P EST MOD 30 MIN: CPT | Performed by: PEDIATRICS

## 2024-08-26 NOTE — PROGRESS NOTES
INITIAL BREAST FEEDING EVALUATION    Informant/Relationship: Simon and Issac    Discussion of General Lactation Issues: Sonia had trouble latching in the beginning.  The hospital lactation consultant recommended a nipple shield.  Initially, Simon did not feel that the shield helped but within a day or so, he was feeding well with it.  For the last few days, she has been able to feed him without the shield. She is concerned because he will not always feed for 15 minutes on each breast as she was instructed to do and she can no longer see the milk as he feeds.    Infant is 14 days old today.        History:  Fertility Problem:no  Breast changes:yes - breasts were cesar, areolas were larger and darker  : yes - not induced.  Had an epidural  Full term:yes - 38 4/7 weeks   labor:no  First nursing/attempt < 1 hour after birth:yes - attempted in the delivery room. Baby was unable to latch  Skin to skin following delivery:yes - immediately in the delivery room  Breast changes after delivery:yes - milk was in by day 3-4  Rooming in (infant in room with mother with exception of procedures, eg. Circumcision:  yes  Blood sugar issues:no  NICU stay:no  Jaundice:no  Phototherapy:no  Supplement given: (list supplement and method used as well as reason(s):yes - expressed colostrum via syringe due to latch issues.    No past medical history on file.      Current Outpatient Medications:     acetaminophen (TYLENOL) 325 mg tablet, Take 2 tablets (650 mg total) by mouth every 6 (six) hours, Disp: , Rfl:     aluminum-magnesium hydroxide-simethicone (MAALOX) 2203-0144-272 mg/30 mL suspension, Take 15 mL by mouth every 6 (six) hours as needed for indigestion or heartburn, Disp: , Rfl:     benzocaine-menthol-lanolin-aloe (DERMOPLAST) 20-0.5 % topical spray, Apply 1 Application topically every 6 (six) hours as needed for mild pain or irritation, Disp: , Rfl:     Blood Pressure Monitoring (Comfort Touch BP Cuff/Medium) MISC,  Use 1 Units 2 (two) times a day, Disp: 1 each, Rfl: 0    calcium carbonate (TUMS) 500 mg chewable tablet, Chew 2 tablets (1,000 mg total) 3 (three) times a day as needed for indigestion or heartburn, Disp: , Rfl:     ferrous sulfate 324 (65 Fe) mg, Take 1 tablet (324 mg total) by mouth in the morning, Disp: 30 tablet, Rfl: 2    hydrocortisone 1 % cream, Apply 1 Application topically daily as needed for irritation, Disp: , Rfl:     ibuprofen (MOTRIN) 200 mg tablet, Take 3 tablets (600 mg total) by mouth every 6 (six) hours, Disp: , Rfl:     Prenatal Vit-Fe Fumarate-FA (PRENATAL VITAMINS PO), Take by mouth, Disp: , Rfl:     senna (SENOKOT) 8.6 mg, Take 1 tablet (8.6 mg total) by mouth daily, Disp: , Rfl:     witch hazel-glycerin (TUCKS) topical pad, Apply 1 Pad topically every 4 (four) hours as needed for irritation, Disp: , Rfl:     No Known Allergies    Social History     Substance and Sexual Activity   Drug Use Never       Social History Never a smoker    Interval Breastfeeding History:  Frequency of breast feeding: Every 2-3 hours on demand  Does mother feel breastfeeding is effective: Yes  Does infant appear satisfied after nursing:Yes  Stooling pattern normal: Yes  Urinating frequently:Yes  Using shield or shells: Yes, for the first week or so    Alternative/Artificial Feedings:   Bottle: No  Cup: No  Syringe/Finger: No           Formula Type: none                     Amount: n/a            Breast Milk:                      Amount: n/a            Frequency Q 2-3 Hr between feedings  Elimination Problems: No      Equipment:  Nipple Shield             Type: Medela              Size: 24mm             Frequency of Use: none for the last few days  Pump            Type: Medela             Frequency of Use: none recently  Shells            Type: Silverettes            Frequency of use: has been wearing them continually at the recommendation of a friend, not because she has any pain    Equipment Problems:  no    Mom:  Breast: Large symmetrical breasts.  Conical shape. Closely spaced.  There are pressure lines on both areolas were the Silverettes were positioned.   Nipple Assessment in General: Normal: elongated/eraser, no discoloration and no damage noted.  Mother's Awareness of Feeding Cues                 Recognizes: Yes                  Verbalizes: Yes  Support System: FOB, and a good community of support  History of Breastfeeding: none  Changes/Stressors/Violence: Simon has questions but no real concerns at this time  Concerns/Goals: Simon plans to breastfeed for 2 years    Problems with Mom: none    Physical Exam  Constitutional:       Appearance: Normal appearance.   HENT:      Head: Normocephalic and atraumatic.      Nose: Nose normal.   Pulmonary:      Effort: Pulmonary effort is normal.   Musculoskeletal:         General: Normal range of motion.      Cervical back: Normal range of motion and neck supple.   Neurological:      Mental Status: She is alert and oriented to person, place, and time.   Skin:     General: Skin is warm and dry.   Psychiatric:         Mood and Affect: Mood normal.         Behavior: Behavior normal.         Thought Content: Thought content normal.         Judgment: Judgment normal.       Infant:  Behaviors: Alert  Color: Normal  Birth weight: 3280 grams  Current weight: 3420 grams    Problems with infant: none currently      General Appearance:  Alert, active, no distress                             Head:  Normocephalic, AFOF, sutures opposed                             Eyes:  Conjunctiva clear, no drainage                              Ears:  Normally placed, no anomolies                             Nose:  No drainage or erythema                           Mouth:  No lesions. Tongue extends beyond the lower lip, tips slightly on it's side when lateralizing but lifts without restriction to the alveolar ridge.  Good cupping and peristalsis felt as he sucked on my finger.  Occasionally, poor  cupping and peristalsis was felt.  This appeared to be a response to lack of flow from my finger.                    Neck:  Supple, symmetrical, trachea midline                 Respiratory:  No grunting, flaring, retractions, breath sounds clear and equal            Cardiovascular:  Regular rate and rhythm. No murmur. Adequate perfusion/capillary refill. Femoral pulse present                    Abdomen:   Soft, non-tender, no masses, bowel sounds present, no HSM             Genitourinary:  Normal male, testes descended, no discharge, swelling, or pain, anus patent                          Spine:   No abnormalities noted        Musculoskeletal:  Full range of motion          Skin/Hair/Nails:   Skin warm, dry, and intact, no rashes or abnormal dyspigmentation or lesions                Neurologic:   No abnormal movement, tone appropriate for gestational age    [place] Latch:  Efficiency:               Lips Flanged: upper lip neutral on the breast, lower lip flanged              Depth of latch: good              Audible Swallow: Yes, sustained SSB for the first few minutes.  Then bursts were short with frequent pauses with 3-4 sucks prior to each swallow              Visible Milk: Yes              Wide Open/ Asymmetrical: Yes              Suck Swallow Cycle: Breathing: unlabored, Coordinated: yes  Nipple Assessment after latch: Normal: elongated/eraser, no discoloration and no damage noted.  Latch Problems: Simon needed support with positioning.. After education, she was able to guide Vefa to an effective latch.  Active feeding, with sustained SSB, was only observed for a few minutes however.    Position:  Infant's Ergonomics/Body               Body Alignment: after education               Head Supported: Yes               Close to Mom's body/ Lifted/ Supported: Yes               Mom's Ergonomics/Body: Yes, after education                           Supported: Yes, after education                           Sitting Back:  Yes, after education                           Brings Baby to her breast: Yes, after education.  Positioning Problems: Initially, Simon positioned Vefa in football hold on his back under the breast.  She leaned over him to bring her nipple to his mouth.      Handouts:   None    Education:  Reviewed Latch: Demonstrated how to gently compress the breast and align the baby so that his nose is just above the nipple with his lower lip and chin touching the breast to encourage the deepest, widest, off-center latch.   Reviewed Positioning for Dyad: Demonstrated how to position mom comfortably and supported and baby belly to belly prior to bringing her baby to her breast  Reviewed Mom/Breast care: Discussed how using Silverettes can lead to maceration of the nipple or pressure wounds. Discussed other soothing techniques for nipple pain        Plan:    Reassurance and support given.  I acknowledged Simon's concerns and her commitment to breastfeeding.  I encouraged her to continue to feed Vefa on demand.  We worked on positioning to improve her comfort and confidence.  I recommended that she discontinue using the Silverettes as they can cause damage to the skin of her areolas and nipples.  I encouraged her to call with any questions or concerns.    I have spent 90 minutes with Patient and family today in which greater than 50% of this time was spent in counseling/coordination of care regarding Patient and family education and Counseling / Coordination of care.

## 2024-08-26 NOTE — PATIENT INSTRUCTIONS
Continue to feed Vefa on demand.  Gently compress the breast and align the baby so that his nose is just above the nipple with his lower lip and chin touching the breast to encourage the deepest, widest, off-center latch.   [employer] Latching Video    [email or web address]   Please call with any questions or concerns.

## 2024-08-30 NOTE — PROGRESS NOTES
I have reviewed the notes, assessments, and/or procedures performed by Krys Lopez RN, IBCLC, I concur with her/his documentation of Simon Singer MD 08/30/24

## 2024-09-03 ENCOUNTER — POSTPARTUM VISIT (OUTPATIENT)
Dept: OBGYN CLINIC | Facility: CLINIC | Age: 29
End: 2024-09-03

## 2024-09-03 VITALS
HEART RATE: 74 BPM | OXYGEN SATURATION: 99 % | HEIGHT: 64 IN | DIASTOLIC BLOOD PRESSURE: 70 MMHG | WEIGHT: 144.4 LBS | BODY MASS INDEX: 24.65 KG/M2 | SYSTOLIC BLOOD PRESSURE: 106 MMHG

## 2024-09-03 PROBLEM — O99.013 ANEMIA AFFECTING PREGNANCY IN THIRD TRIMESTER: Status: RESOLVED | Noted: 2024-06-20 | Resolved: 2024-09-03

## 2024-09-03 PROBLEM — O09.899 MATERNAL VARICELLA, NON-IMMUNE: Status: RESOLVED | Noted: 2024-02-08 | Resolved: 2024-09-03

## 2024-09-03 PROBLEM — O13.9 GESTATIONAL HYPERTENSION: Status: RESOLVED | Noted: 2024-08-13 | Resolved: 2024-09-03

## 2024-09-03 PROBLEM — Z3A.38 38 WEEKS GESTATION OF PREGNANCY: Status: RESOLVED | Noted: 2024-08-11 | Resolved: 2024-09-03

## 2024-09-03 PROBLEM — Z28.39 MATERNAL VARICELLA, NON-IMMUNE: Status: RESOLVED | Noted: 2024-02-08 | Resolved: 2024-09-03

## 2024-09-03 PROBLEM — O99.820 GROUP B STREPTOCOCCAL CARRIAGE COMPLICATING PREGNANCY: Status: RESOLVED | Noted: 2024-08-05 | Resolved: 2024-09-03

## 2024-09-03 PROBLEM — O42.90 AMNIOTIC FLUID LEAKING: Status: RESOLVED | Noted: 2024-08-11 | Resolved: 2024-09-03

## 2024-09-03 PROCEDURE — 99024 POSTOP FOLLOW-UP VISIT: CPT | Performed by: OBSTETRICS & GYNECOLOGY

## 2024-09-03 NOTE — PROGRESS NOTES
Subjective     Simon Nguyễn is a 29 y.o. y.o. female  who presents for a postpartum visit. She is 3 weeks postpartum following a spontaneous vaginal delivery on 24. I have fully reviewed the prenatal and intrapartum course. The delivery was at 38 gestational weeks. Outcome: spontaneous vaginal delivery. Anesthesia: epidural. Postpartum course has been uncomplicated. She was diagnosed with gHTN based off of two BP's but has been asymptomatic with normal BP's thereafter. Baby's course has been uncomplicated. Baby is feeding by breast. Bleeding thin lochia. Bowel function is normal. Bladder function is normal. Patient is not sexually active. Contraception method is none. Postpartum depression screening: negative.    The following portions of the patient's history were reviewed and updated as appropriate: allergies, current medications, past family history, past medical history, past social history, past surgical history, and problem list.    Review of Systems  Pertinent items are noted in HPI..    Objective   Vitals:    24 1000   BP: 106/70   Pulse: 74   SpO2: 99%       Physical Exam  Constitutional:       Appearance: She is well-developed.   Genitourinary:   Breasts:     Right: Normal. No swelling, mass or tenderness.      Left: Normal. No swelling, mass or tenderness.   Cardiovascular:      Rate and Rhythm: Normal rate and regular rhythm.      Heart sounds: Normal heart sounds. No murmur heard.     No friction rub. No gallop.   Pulmonary:      Effort: Pulmonary effort is normal. No respiratory distress.      Breath sounds: No wheezing.   Abdominal:      Palpations: Abdomen is soft.      Tenderness: There is no abdominal tenderness.   Musculoskeletal:         General: No tenderness.   Neurological:      Mental Status: She is alert and oriented to person, place, and time.   Vitals reviewed.         Assessment/Plan     Normal postpartum exam.     1. Contraception: none  2. Follow up in: 3 weeks or as  needed.

## 2024-09-18 PROBLEM — Z34.03 ENCOUNTER FOR SUPERVISION OF NORMAL FIRST PREGNANCY IN THIRD TRIMESTER: Status: RESOLVED | Noted: 2024-02-08 | Resolved: 2024-05-03

## 2024-12-09 ENCOUNTER — OFFICE VISIT (OUTPATIENT)
Dept: FAMILY MEDICINE CLINIC | Facility: MEDICAL CENTER | Age: 29
End: 2024-12-09
Payer: COMMERCIAL

## 2024-12-09 VITALS
SYSTOLIC BLOOD PRESSURE: 110 MMHG | BODY MASS INDEX: 24.89 KG/M2 | OXYGEN SATURATION: 98 % | HEIGHT: 64 IN | RESPIRATION RATE: 18 BRPM | WEIGHT: 145.8 LBS | DIASTOLIC BLOOD PRESSURE: 70 MMHG | HEART RATE: 69 BPM | TEMPERATURE: 97.3 F

## 2024-12-09 DIAGNOSIS — R39.15 URINARY URGENCY: ICD-10-CM

## 2024-12-09 DIAGNOSIS — K64.4 EXTERNAL HEMORRHOIDS: Primary | ICD-10-CM

## 2024-12-09 PROCEDURE — 87086 URINE CULTURE/COLONY COUNT: CPT | Performed by: STUDENT IN AN ORGANIZED HEALTH CARE EDUCATION/TRAINING PROGRAM

## 2024-12-09 PROCEDURE — 81001 URINALYSIS AUTO W/SCOPE: CPT | Performed by: STUDENT IN AN ORGANIZED HEALTH CARE EDUCATION/TRAINING PROGRAM

## 2024-12-09 PROCEDURE — 99214 OFFICE O/P EST MOD 30 MIN: CPT | Performed by: STUDENT IN AN ORGANIZED HEALTH CARE EDUCATION/TRAINING PROGRAM

## 2024-12-09 NOTE — PROGRESS NOTES
[facility] - Clinic Note  Ava Brewer DO, 24     Simon Nguyễn MRN: [id number] : 1995 Age: 29 y.o.     Assessment/Plan     1. External hemorrhoids (Primary)    -Distributed educational material  -Discussed symptomatic and preventative measures regarding hemorrhoidal disease  -Recommended fiber supplementation and can also consider stool softener Colace 100 mg p.o. twice daily  -Hydrate well  -Patient would like referral for surgical consultation  - Ambulatory Referral to Colorectal Surgery; Future    2. Urinary urgency    - Urinalysis with microscopic; Future  - Urine culture; Future    Simon Nguyễn acknowledged understanding of treatment plan, all questions answered.    Subjective     Simon Nguyễn is a 29 y.o. female who presents for evaluation of possible hemorrhoids. Onset of symptoms was gradual. Symptoms have been unchanged since that time. Symptoms include: anorectal itching, bleeding which only occurs with bowel movements, constipation, and painful defecation. Patient denies family hx of colorectal CA. Treatment to date has been : Preparation H.  Patient also mentions urinary urgency.  No fevers or flank pain.    The following portions of the patient's history were reviewed and updated as appropriate: allergies, current medications, past family history, past medical history, past social history, past surgical history and problem list.     History reviewed. No pertinent past medical history.    No Known Allergies    History reviewed. No pertinent surgical history.    Family History   Problem Relation Age of Onset    No Known Problems Mother     No Known Problems Father     No Known Problems Sister     No Known Problems Sister     No Known Problems Maternal Grandmother     No Known Problems Maternal Grandfather     No Known Problems Paternal Grandmother     No Known Problems Paternal Grandfather        Social History     Socioeconomic History    Marital status: /Civil Union      Spouse name: None    Number of children: None    Years of education: None    Highest education level: None   Occupational History    None   Tobacco Use    Smoking status: Never    Smokeless tobacco: Never   Vaping Use    Vaping status: Never Used   Substance and Sexual Activity    Alcohol use: Never    Drug use: Never    Sexual activity: Yes     Partners: Male     Birth control/protection: Condom Male   Other Topics Concern    None   Social History Narrative    None     Social Drivers of Health     Financial Resource Strain: Not on file   Food Insecurity: No Food Insecurity (8/12/2024)    Nursing - Inadequate Food Risk Classification     Worried About Running Out of Food in the Last Year: Never true     Ran Out of Food in the Last Year: Never true     Ran Out of Food in the Last Year: Not on file   Transportation Needs: No Transportation Needs (8/12/2024)    PRAPARE - Transportation     Lack of Transportation (Medical): No     Lack of Transportation (Non-Medical): No   Physical Activity: Not on file   Stress: Not on file   Social Connections: Not on file   Intimate Partner Violence: Not on file   Housing Stability: Low Risk  (8/12/2024)    Housing Stability Vital Sign     Unable to Pay for Housing in the Last Year: No     Number of Times Moved in the Last Year: 0     Homeless in the Last Year: No       Current Outpatient Medications   Medication Sig Dispense Refill    Prenatal Vit-Fe Fumarate-FA (PRENATAL VITAMINS PO) Take by mouth      acetaminophen (TYLENOL) 325 mg tablet Take 2 tablets (650 mg total) by mouth every 6 (six) hours (Patient not taking: Reported on 8/26/2024)      aluminum-magnesium hydroxide-simethicone (MAALOX) 7941-4316-898 mg/30 mL suspension Take 15 mL by mouth every 6 (six) hours as needed for indigestion or heartburn (Patient not taking: Reported on 8/26/2024)      benzocaine-menthol-lanolin-aloe (DERMOPLAST) 20-0.5 % topical spray Apply 1 Application topically every 6 (six) hours as needed  "for mild pain or irritation (Patient not taking: Reported on 8/26/2024)      Blood Pressure Monitoring (Comfort Touch BP Cuff/Medium) MISC Use 1 Units 2 (two) times a day (Patient not taking: Reported on 8/26/2024) 1 each 0    calcium carbonate (TUMS) 500 mg chewable tablet Chew 2 tablets (1,000 mg total) 3 (three) times a day as needed for indigestion or heartburn (Patient not taking: Reported on 8/26/2024)      ferrous sulfate 324 (65 Fe) mg Take 1 tablet (324 mg total) by mouth in the morning 30 tablet 2    hydrocortisone 1 % cream Apply 1 Application topically daily as needed for irritation (Patient not taking: Reported on 8/26/2024)      ibuprofen (MOTRIN) 200 mg tablet Take 3 tablets (600 mg total) by mouth every 6 (six) hours (Patient not taking: Reported on 8/26/2024)      senna (SENOKOT) 8.6 mg Take 1 tablet (8.6 mg total) by mouth daily (Patient not taking: Reported on 8/26/2024)      witch hazel-glycerin (TUCKS) topical pad Apply 1 Pad topically every 4 (four) hours as needed for irritation (Patient not taking: Reported on 8/26/2024)       No current facility-administered medications for this visit.       Review of Systems     As noted in HPI    Objective      /70 (BP Location: Left arm, Patient Position: Sitting, Cuff Size: Standard)   Pulse 69   Temp (!) 97.3 °F (36.3 °C) (Temporal)   Resp 18   Ht 5' 4\" (1.626 m)   Wt 66.1 kg (145 lb 12.8 oz)   SpO2 98%   BMI 25.03 kg/m²     Physical Exam  Vitals reviewed. Exam conducted with a chaperone present (ANASTASIYA Palacios).   Constitutional:       General: She is not in acute distress.     Appearance: Normal appearance.   HENT:      Head: Normocephalic and atraumatic.   Eyes:      Conjunctiva/sclera: Conjunctivae normal.   Pulmonary:      Effort: Pulmonary effort is normal.   Abdominal:      General: Abdomen is flat. Bowel sounds are normal.      Palpations: Abdomen is soft.      Tenderness: There is no abdominal tenderness. There is no right CVA " "tenderness, left CVA tenderness, guarding or rebound.   Genitourinary:     Rectum: Tenderness and external hemorrhoid present. Normal anal tone.   Skin:     General: Skin is warm and dry.   Neurological:      Mental Status: She is alert and oriented to person, place, and time.   Psychiatric:         Mood and Affect: Mood normal.         Behavior: Behavior normal.         Thought Content: Thought content normal.             Some portions of this record may have been generated with voice recognition software. There may be translation, syntax, or grammatical errors. Occasional wrong word or \"sound-a-like\" substitutions may have occurred due to the inherent limitations of the voice recognition software. Read the chart carefully and recognize, using context, where substations may have occurred. If you have any questions, please contact the dictating provider for clarification or correction, as needed.  "

## 2024-12-09 NOTE — PATIENT INSTRUCTIONS
Patient Education     Hemorrhoids   The Basics   Written by the doctors and editors at [facility]   What are hemorrhoids? -- Hemorrhoids are swollen veins in the rectum. They can cause itching, bleeding, and pain. Hemorrhoids are very common.  In some cases, you can see or feel hemorrhoids around the outside of the rectum. In other cases, you cannot see them because they are hidden inside the rectum (figure 1).  What are the symptoms of hemorrhoids? -- Hemorrhoids do not always cause symptoms. But when they do, symptoms can include:   Itching of the skin around the anus   Bleeding - Bleeding is usually painless. You might see bright red blood after using the toilet.   Pain - If a blood clot forms inside a hemorrhoid, this can cause pain. It can also cause a lump that you might be able to feel.   Swelling - Hemorrhoids can swell or dangle outside of the rectum during a bowel movement.  If you notice bleeding when you have a bowel movement, or if your bowel movements look like tar, see a doctor or nurse. Bleeding could be caused by something other than hemorrhoids, so you should have it checked out.  How can I care for myself at home? -- If you do have hemorrhoids, your doctor or nurse can suggest treatments. But there some steps that you can try on your own first.  The most important thing that you can do is try to prevent constipation and keep your bowel movements soft. You should have a bowel movement at least a few times a week. Here are some steps that you can take:   Eat lots of fruits, vegetables, and other foods with fiber (figure 2). Fiber helps to increase bowel movements.  You need 20 to 35 grams of fiber a day to keep your bowel movements regular (table 1). If you do not get enough fiber from your diet, you can take fiber supplements. These come in the form of powders, wafers, or pills. They include psyllium seed (sample brand names: Metamucil, Konsyl), methylcellulose (sample brand name: Citrucel),  "polycarbophil (sample brand name: FiberCon), and wheat dextrin (sample brand name: Benefiber). If you take a fiber supplement, be sure to read the label so you know how much to take. If you're not sure, ask your doctor nurse.   Drink plenty of water and other fluids. This is especially important if you take a fiber supplement.   Limit fatty foods and alcohol. These can make constipation worse.   Take medicines called \"stool softeners\" such as docusate sodium (sample brand names: Colace, Dulcolax). These medicines increase the number of bowel movements you have. They are safe to take and they can prevent problems later.   Take your time when having a bowel movement. But do not spend too much time on the toilet (for example, reading). Also, try not to push hard or strain when having a bowel movement.   Get regular physical activity. Even gentle forms of exercise, like walking, are good for your health.  To relieve symptoms of hemorrhoids, you can:   Soak your buttocks in 2 or 3 inches of warm water - You can do this up to 2 to 3 times a day for 10 to 15 minutes. Do not add soap, bubble bath, or anything to the water.   Try non-prescription medicines - You can find these in a pharmacy (table 2). They include creams or ointments that you rub on your anus to relieve pain, itching, and swelling. Some hemorrhoid medicines come in a capsule (called a suppository) that you put inside your rectum. Others come in a cream that comes in a bottle with a nozzle that you put inside your rectum.  Do not use medicines that have hydrocortisone (a steroid medicine) for more than a week, unless your doctor or nurse approves.  Are there other treatments for hemorrhoids? -- Yes. If you still have symptoms after trying the steps listed above, you might need treatments to destroy or remove the hemorrhoids.  One popular treatment for hemorrhoids inside the rectum is called \"rubber band ligation.\" For this treatment, the doctor ties tiny " "rubber bands around the hemorrhoids. A few days later the hemorrhoids shrink and stop bleeding. Doctors can also use lasers, heat, or chemicals to destroy hemorrhoids.  If none of these options works, your doctor might suggest surgery to remove or tie off the blood vessels of the hemorrhoids. Hemorrhoids on the outside of the rectum can only be removed with surgery.  When should I call my doctor or nurse? -- Call for advice if:   You have new or increased bleeding from your rectum.   You have tissue sticking out from your rectum that you can't push back in.   You are not able to pass bowel movements because of pain.   Your symptoms are getting worse even with home care.   You have a fever of 100.4°F (38°C) or higher.  All topics are updated as new evidence becomes available and our peer review process is complete.  This topic retrieved from [employer] on: Feb 26, 2024.  Topic 11392 Version 19.0  Release: 32.2.4 - C32.56  © 2024 UpToDate, Inc. and/or its affiliates. All rights reserved.  figure 1: Hemorrhoids     Hemorrhoids that are hidden inside the rectum are called \"internal\" hemorrhoids. You cannot see them, but they can cause symptoms. Hemorrhoids that you can see or feel are called \"external\" hemorrhoids.  Graphic 14787 Version 2.0  figure 2: Foods with fiber     Foods with a lot of fiber include prunes, apples, oranges, bananas, peas, green beans, kidney beans, cooked oatmeal, almonds, peanuts, and whole-wheat bread.  Graphic 43732 Version 1.0  table 1: Amount of fiber in different foods  Food  Serving  Grams of fiber    Fruits    Apple (with skin) 1 medium apple 4.4   Banana 1 medium banana 3.1   Oranges 1 orange 3.1   Prunes 1 cup, pitted 12.4   Juices    Apple, unsweetened, with added ascorbic acid 1 cup 0.5   Grapefruit, white, canned, sweetened 1 cup 0.2   Grape, unsweetened, with added ascorbic acid 1 cup 0.5   Orange 1 cup 0.7   Vegetables    Cooked   Green beans 1 cup 4.0   Carrots 1/2 cup sliced 2.3 "   Peas 1 cup 8.8   Potato (baked, with skin) 1 medium potato 3.8   Raw   Cucumber (with peel) 1 cucumber 1.5   Lettuce 1 cup shredded 0.5   Tomato 1 medium tomato 1.5   Spinach 1 cup 0.7   Legumes   Baked beans, canned, no salt added 1 cup 13.9   Kidney beans, canned 1 cup 13.6   Lima beans, canned 1 cup 11.6   Lentils, boiled 1 cup 15.6   Breads, pastas, flours    Bran muffins 1 medium muffin 5.2   Oatmeal, cooked 1 cup 4.0   White bread 1 slice 0.6   Whole-wheat bread 1 slice 1.9   Pasta and rice, cooked   Macaroni 1 cup 2.5   Rice, brown 1 cup 3.5   Rice, white 1 cup 0.6   Spaghetti (regular) 1 cup 2.5   Nuts    Almonds 1/2 cup 8.7   Peanuts 1/2 cup 7.9   To learn how much fiber and other nutrients are in different foods, visit the [place] [employer] of [employer] ([employer]) Food[employer] Central website.  Graphic 43397 Version 6.0  table 2: Common at-home treatments for hemorrhoids  Type of medicine  Examples  Notes    Stool softener Docusate sodium (sample brand names: Colace, Docu, Stool Softener) Softens bowel movements  Helps avoid straining while sitting on toilet   Bulk-forming laxatives and fiber supplements Methylcellulose (sample brand names: Citrucel, Soluble Fiber Therapy)  Polycarbophil (sample brand names: FiberCon, Konsyl Fiber)  Psyllium (sample brand names: Metamucil, Konsyl)  Wheat dextrin (sample brand name: Benefiber) Prevent hard dry stools which make hemorrhoids worse  Might reduce bleeding and other hemorrhoid symptoms  Needs to be taken with plenty of water (8 glasses of water a day)  Your doctor might suggest starting with a small dose and then increasing over time   Pain relief Pramoxine rectal foam, ointment, or wipes (sample brand names: Proctofoam, Pramox) Can help with pain and itching  Area must be gently cleaned and allowed to dry before using   Medicines to dry or protect skin Witch hazel (sample brand names: Tucks, Preparation H pads, Preparation H wipes)  Zinc oxide topical paste  (sample brand names: Danelle's Butt Paste, Desitin) May dry or tighten skin around the anus  Zinc oxide also protects skin from irritation  Area must be gently cleaned and allowed to dry before using  Can apply after a sitz bath (soaking in warm, shallow water)  Witch hazel wipes or unscented baby wipes can be used to clean the anus after a bowel movement   Steroid cream Hydrocortisone rectal cream (sample brand name: Preparation H hydrocortisone) Reduces swelling, and pain caused by hemorrhoids  Do not use for longer than a week  Do not use at all if you are pregnant unless your doctor or nurse tells you to   Medicines to shrink hemorrhoids Phenylephrine ointment or suppository (sample brand names: Preparation H, Rectacaine) Phenylephrine shrinks swollen hemorrhoids, and relieves itching and discomfort for a few hours  Area must be gently cleaned and allowed to dry before applying   Numbing ointments Benzocaine rectal ointment (sample brand name: Americaine)  Dibucaine rectal ointment (sample brand name: Nupercainal)  Lidocaine rectal cream (sample brand name: RectiCare) Benzocaine, dibucaine, and lidocaine can help with pain and itching, but should not be used without talking to a doctor first. They should only be used once in a while, in small amounts.   This table lists some examples of over-the-counter treatments for hemorrhoids. There are many more brand-name and generic versions available, too. Read all labels to make sure you're not using too much of one ingredient.  Do not use over-the-counter treatments for more than one week without speaking to your doctor. They can damage your skin.  Follow instructions carefully - for example, it's important to clean and dry your skin before using creams or ointments. You can use an unscented baby wipe to clean your anus after a bowel movement.  If you have rectal bleeding, or if your bowel movements look like tar, see your doctor or nurse. These problems could be  caused by something other than hemorrhoids. You should also see your doctor or nurse if your hemorrhoid symptoms still bother you after you have tried taking care of them yourself.  Graphic 234799 Version 7.0  Consumer Information Use and Disclaimer   Disclaimer: This generalized information is a limited summary of diagnosis, treatment, and/or medication information. It is not meant to be comprehensive and should be used as a tool to help the user understand and/or assess potential diagnostic and treatment options. It does NOT include all information about conditions, treatments, medications, side effects, or risks that may apply to a specific patient. It is not intended to be medical advice or a substitute for the medical advice, diagnosis, or treatment of a health care provider based on the health care provider's examination and assessment of a patient's specific and unique circumstances. Patients must speak with a health care provider for complete information about their health, medical questions, and treatment options, including any risks or benefits regarding use of medications. This information does not endorse any treatments or medications as safe, effective, or approved for treating a specific patient. UpToDate, Inc. and its affiliates disclaim any warranty or liability relating to this information or the use thereof.The use of this information is governed by the Terms of Use, available at https://www.wolterskluwer.com/en/know/clinical-effectiveness-terms. 2024© UpToDate, Inc. and its affiliates and/or licensors. All rights reserved.  Copyright   © 2024 UpToDate, Inc. and/or its affiliates. All rights reserved.

## 2024-12-10 ENCOUNTER — RESULTS FOLLOW-UP (OUTPATIENT)
Dept: FAMILY MEDICINE CLINIC | Facility: MEDICAL CENTER | Age: 29
End: 2024-12-10

## 2024-12-10 LAB
BACTERIA UR CULT: NORMAL
BACTERIA UR QL AUTO: NORMAL /HPF
BILIRUB UR QL STRIP: NEGATIVE
CLARITY UR: CLEAR
COLOR UR: COLORLESS
GLUCOSE UR STRIP-MCNC: NEGATIVE MG/DL
HGB UR QL STRIP.AUTO: NEGATIVE
KETONES UR STRIP-MCNC: NEGATIVE MG/DL
LEUKOCYTE ESTERASE UR QL STRIP: NEGATIVE
NITRITE UR QL STRIP: NEGATIVE
NON-SQ EPI CELLS URNS QL MICRO: NORMAL /HPF
PH UR STRIP.AUTO: 6 [PH]
PROT UR STRIP-MCNC: NEGATIVE MG/DL
RBC #/AREA URNS AUTO: NORMAL /HPF
SP GR UR STRIP.AUTO: 1.01 (ref 1–1.03)
UROBILINOGEN UR STRIP-ACNC: <2 MG/DL
WBC #/AREA URNS AUTO: NORMAL /HPF

## 2024-12-18 ENCOUNTER — ANNUAL EXAM (OUTPATIENT)
Dept: OBGYN CLINIC | Facility: CLINIC | Age: 29
End: 2024-12-18
Payer: COMMERCIAL

## 2024-12-18 VITALS
WEIGHT: 148 LBS | DIASTOLIC BLOOD PRESSURE: 66 MMHG | HEIGHT: 64 IN | SYSTOLIC BLOOD PRESSURE: 108 MMHG | BODY MASS INDEX: 25.27 KG/M2

## 2024-12-18 DIAGNOSIS — Z72.3 INADEQUATE EXERCISE: ICD-10-CM

## 2024-12-18 DIAGNOSIS — Z01.419 ENCOUNTER FOR ANNUAL ROUTINE GYNECOLOGICAL EXAMINATION: Primary | ICD-10-CM

## 2024-12-18 DIAGNOSIS — E58 DIETARY CALCIUM DEFICIENCY: ICD-10-CM

## 2024-12-18 PROCEDURE — 99459 PELVIC EXAMINATION: CPT | Performed by: OBSTETRICS & GYNECOLOGY

## 2024-12-18 PROCEDURE — 99395 PREV VISIT EST AGE 18-39: CPT | Performed by: OBSTETRICS & GYNECOLOGY

## 2024-12-18 NOTE — PROGRESS NOTES
Pt is a 29 y.o.  with No LMP recorded (lmp unknown). using abstinence and condoms (male) for BC presents for preventive care.   She notes the same partner since her last STI evaluation. In her lifetime she has been involved with 1 partner .   Safe sexual practices (monogomy, condoms) are followed consistently.         She does  feel safe in the relationship.  She does feel safe in her home.    Her calcium intake encompasses cheese and yogurt for a total of 2-3 servings daily on average.  She does take additional Vitamin D (MVI or supplement).    She exercises 2-3 times per week.  Her menses have ceased with breast feeding.    Menstrual History:  OB History          1    Para   1    Term   1            AB        Living   1         SAB        IAB        Ectopic        Multiple   0    Live Births   1           Obstetric Comments   Menarche: 13    No menses while breast feeding.               Menarche age: 13  No LMP recorded (lmp unknown).          She has never recieved an HPV vaccine and does not desire to begin or continue the HPV vaccination series.  tobacco use : does not use tobacco              Colonoscopy: not indicated  Mammogram: not indicated  Pap: 2023-wnl    Past Medical History:   Diagnosis Date    Gestational hypertension     HPV vaccine counseling     declines    Maternal varicella, non-immune     given 1 dose post delivery       Past Surgical History:   Procedure Laterality Date    NO PAST SURGERIES         OB History    Para Term  AB Living   1 1 1   1   SAB IAB Ectopic Multiple Live Births      0 1      # Outcome Date GA Lbr Mahesh/2nd Weight Sex Type Anes PTL Lv   1 Term 24 38w4d / 03:20 3280 g (7 lb 3.7 oz) M Vag-Spont EPI N STEVE      Obstetric Comments   Menarche: 13      No menses while breast feeding.             Current Outpatient Medications:     Prenatal Vit-Fe Fumarate-FA (PRENATAL VITAMINS PO), Take by mouth, Disp: , Rfl:     No Known  Allergies    Social History     Socioeconomic History    Marital status: /Civil Union     Spouse name: Sefa    Number of children: 1    Years of education: None    Highest education level: Bachelor's degree (e.g., BA, AB, BS)   Occupational History    Occupation: Engineering   Tobacco Use    Smoking status: Never    Smokeless tobacco: Never   Vaping Use    Vaping status: Never Used   Substance and Sexual Activity    Alcohol use: Never    Drug use: Never    Sexual activity: Yes     Partners: Male     Birth control/protection: Condom Male     Comment: lifetime partners: 1, not active since delivery   Other Topics Concern    None   Social History Narrative    Bahai: Jainism    Accepts blood products            Exercise: walking 2x/week x 30 minutes    Calcium: 1 yogurt daily, 1 cheese daily, pnv daily     Social Drivers of Health     Financial Resource Strain: Not on file   Food Insecurity: No Food Insecurity (8/12/2024)    Nursing - Inadequate Food Risk Classification     Worried About Running Out of Food in the Last Year: Never true     Ran Out of Food in the Last Year: Never true     Ran Out of Food in the Last Year: Not on file   Transportation Needs: No Transportation Needs (8/12/2024)    PRAPARE - Transportation     Lack of Transportation (Medical): No     Lack of Transportation (Non-Medical): No   Physical Activity: Not on file   Stress: Not on file   Social Connections: Not on file   Intimate Partner Violence: Not on file   Housing Stability: Low Risk  (8/12/2024)    Housing Stability Vital Sign     Unable to Pay for Housing in the Last Year: No     Number of Times Moved in the Last Year: 0     Homeless in the Last Year: No       Family History   Problem Relation Age of Onset    No Known Problems Mother     No Known Problems Father     No Known Problems Sister     No Known Problems Sister     No Known Problems Maternal Grandmother     No Known Problems Maternal Grandfather     No Known Problems Paternal  "Grandmother     Lung cancer Paternal Grandfather         smoker    Breast cancer Neg Hx     Colon cancer Neg Hx     Ovarian cancer Neg Hx        Blood pressure 108/66, height 5' 4\" (1.626 m), weight 67.1 kg (148 lb), currently breastfeeding. and Body mass index is 25.4 kg/m².    Physical Exam  Constitutional:       General: She is not in acute distress.     Appearance: Normal appearance. She is well-developed and normal weight. She is not ill-appearing.   HENT:      Head: Normocephalic and atraumatic.   Eyes:      Extraocular Movements: Extraocular movements intact.      Conjunctiva/sclera: Conjunctivae normal.   Neck:      Thyroid: No thyromegaly.      Trachea: No tracheal deviation.   Cardiovascular:      Rate and Rhythm: Normal rate and regular rhythm.      Heart sounds: Normal heart sounds.   Pulmonary:      Effort: Pulmonary effort is normal. No respiratory distress.      Breath sounds: Normal breath sounds. No stridor. No wheezing or rales.   Abdominal:      General: Bowel sounds are normal. There is no distension.      Palpations: Abdomen is soft. There is no mass.      Tenderness: There is no abdominal tenderness. There is no guarding or rebound.      Hernia: No hernia is present.   Musculoskeletal:         General: No tenderness. Normal range of motion.      Cervical back: Normal range of motion and neck supple.   Lymphadenopathy:      Cervical: No cervical adenopathy.   Skin:     General: Skin is warm.      Findings: No erythema or rash.   Neurological:      Mental Status: She is alert and oriented to person, place, and time.   Psychiatric:         Mood and Affect: Mood normal.         Behavior: Behavior normal.         Thought Content: Thought content normal.         Judgment: Judgment normal.         Breasts: breasts appear normal, no suspicious masses, no skin or nipple changes or axillary nodes, lactating, no erythema or tenderness, nipples normal.      vulva: normal external genitalia for age and no " lesions, masses, epithelial changes, or exudate  vagina: color pink and rugae  flattening of rugae  cervix: parous and no lesions   uterus: NSSC, AF, NT, mobile  adnexa: no masses or tenderness      A/P:  Pt is a 29 y.o.  with      Busra was seen today for gynecologic exam.    Diagnoses and all orders for this visit:    Encounter for annual routine gynecological examination  -stable examination  -pap up to date    Dietary calcium deficiency  Patient advised recommendation of daily dietary calcium of 1000 mg calcium.      Inadequate exercise  Patient advised recommendation of exercise 5 times per week for 30 minutes.

## 2025-01-02 ENCOUNTER — OFFICE VISIT (OUTPATIENT)
Dept: FAMILY MEDICINE CLINIC | Facility: MEDICAL CENTER | Age: 30
End: 2025-01-02
Payer: COMMERCIAL

## 2025-01-02 VITALS
HEIGHT: 64 IN | RESPIRATION RATE: 18 BRPM | DIASTOLIC BLOOD PRESSURE: 84 MMHG | TEMPERATURE: 97.3 F | SYSTOLIC BLOOD PRESSURE: 114 MMHG | BODY MASS INDEX: 25.13 KG/M2 | HEART RATE: 66 BPM | WEIGHT: 147.2 LBS | OXYGEN SATURATION: 99 %

## 2025-01-02 DIAGNOSIS — D64.9 NORMOCYTIC ANEMIA: Primary | ICD-10-CM

## 2025-01-02 DIAGNOSIS — L98.8 POLYP OF SKIN: ICD-10-CM

## 2025-01-02 PROCEDURE — 99214 OFFICE O/P EST MOD 30 MIN: CPT | Performed by: STUDENT IN AN ORGANIZED HEALTH CARE EDUCATION/TRAINING PROGRAM

## 2025-01-02 PROCEDURE — 88304 TISSUE EXAM BY PATHOLOGIST: CPT | Performed by: PATHOLOGY

## 2025-01-02 NOTE — PROGRESS NOTES
[facility] - Clinic Note  Ava Brewer DO, 26     Simon Nguyễn MRN: [id number] : 1995 Age: 29 y.o.     Assessment/Plan     1. Normocytic anemia (Primary)    - CBC (Includes Diff/Plt) (Refl); Future  - Basic metabolic panel; Future  - TSH, 3rd generation with Free T4 reflex; Future  - Iron Panel (Includes Ferritin, Iron Sat%, Iron, and TIBC); Future    2. Polyp of skin    -Patient tolerated procedure today well without complication, sent to pathology  - Skin excision  - Tissue Exam; Future      Simon Nguyễn acknowledged understanding of treatment plan, all questions answered.    Subjective      Simon Nguễyn is a 29 y.o. female who presents to discuss anemia.  Patient also mentions skin complaint.  She has a skin lesion right axillary region which she states has been present for several years however, seems to be increasing in size.    The following portions of the patient's history were reviewed and updated as appropriate: allergies, current medications, past family history, past medical history, past social history, past surgical history and problem list.     Past Medical History:   Diagnosis Date    Gestational hypertension     HPV vaccine counseling     declines    Maternal varicella, non-immune     given 1 dose post delivery       No Known Allergies    Past Surgical History:   Procedure Laterality Date    NO PAST SURGERIES         Family History   Problem Relation Age of Onset    No Known Problems Mother     No Known Problems Father     No Known Problems Sister     No Known Problems Sister     No Known Problems Maternal Grandmother     No Known Problems Maternal Grandfather     No Known Problems Paternal Grandmother     Lung cancer Paternal Grandfather         smoker    Breast cancer Neg Hx     Colon cancer Neg Hx     Ovarian cancer Neg Hx        Social History     Socioeconomic History    Marital status: /Civil Union     Spouse name: Sefa    Number of children: 1    Years of  "education: None    Highest education level: Bachelor's degree (e.g., BA, AB, BS)   Occupational History    Occupation: Engineering   Tobacco Use    Smoking status: Never    Smokeless tobacco: Never   Vaping Use    Vaping status: Never Used   Substance and Sexual Activity    Alcohol use: Never    Drug use: Never    Sexual activity: Yes     Partners: Male     Birth control/protection: Condom Male     Comment: lifetime partners: 1, not active since delivery   Other Topics Concern    None   Social History Narrative    Zoroastrianism: Hindu    Accepts blood products            Exercise: walking 2x/week x 30 minutes    Calcium: 1 yogurt daily, 1 cheese daily, pnv daily     Social Drivers of Health     Financial Resource Strain: Not on file   Food Insecurity: No Food Insecurity (8/12/2024)    Nursing - Inadequate Food Risk Classification     Worried About Running Out of Food in the Last Year: Never true     Ran Out of Food in the Last Year: Never true     Ran Out of Food in the Last Year: Not on file   Transportation Needs: No Transportation Needs (8/12/2024)    PRAPARE - Transportation     Lack of Transportation (Medical): No     Lack of Transportation (Non-Medical): No   Physical Activity: Not on file   Stress: Not on file   Social Connections: Not on file   Intimate Partner Violence: Not on file   Housing Stability: Low Risk  (8/12/2024)    Housing Stability Vital Sign     Unable to Pay for Housing in the Last Year: No     Number of Times Moved in the Last Year: 0     Homeless in the Last Year: No       Current Outpatient Medications   Medication Sig Dispense Refill    Prenatal Vit-Fe Fumarate-FA (PRENATAL VITAMINS PO) Take by mouth       No current facility-administered medications for this visit.       Review of Systems     As noted in HPI    Objective      /84 (BP Location: Left arm, Patient Position: Sitting, Cuff Size: Standard)   Pulse 66   Temp (!) 97.3 °F (36.3 °C) (Temporal)   Resp 18   Ht 5' 4\" (1.626 m)  " " Wt 66.8 kg (147 lb 3.2 oz)   LMP  (LMP Unknown)   SpO2 99%   BMI 25.27 kg/m²     Physical Exam  Vitals reviewed.   Constitutional:       General: She is not in acute distress.     Appearance: Normal appearance.   HENT:      Head: Normocephalic.   Eyes:      Conjunctiva/sclera: Conjunctivae normal.   Cardiovascular:      Rate and Rhythm: Normal rate and regular rhythm.      Pulses: Normal pulses.      Heart sounds: Normal heart sounds.   Pulmonary:      Effort: Pulmonary effort is normal.      Breath sounds: Normal breath sounds.   Abdominal:      General: Abdomen is flat. Bowel sounds are normal.      Palpations: Abdomen is soft.      Tenderness: There is no abdominal tenderness.   Skin:     Findings: Lesion present.      Comments: Pedunculated skin nodularity about 2 cm in diameter right axillary region   Neurological:      Mental Status: She is alert and oriented to person, place, and time.   Psychiatric:         Mood and Affect: Mood normal.         Behavior: Behavior normal.         Thought Content: Thought content normal.             Skin excision    Date/Time: 1/2/2025 11:40 AM    Performed by: vAa Brewer DO  Authorized by: Ava Brewer DO  Universal Protocol:  Procedure performed by: (ANASTASIYA Palacios)  Consent: Verbal consent obtained.  Risks and benefits: risks, benefits and alternatives were discussed  Consent given by: patient  Time out: Immediately prior to procedure a \"time out\" was called to verify the correct patient, procedure, equipment, support staff and site/side marked as required.  Patient understanding: patient states understanding of the procedure being performed  Patient consent: the patient's understanding of the procedure matches consent given  Procedure consent: procedure consent matches procedure scheduled  Site marked: the operative site was marked  Patient identity confirmed: verbally with patient    Procedure Details - Skin Excision:     Number of Lesions:  1  Lesion 1:     Body " "area:  Upper extremity    Upper extremity location: Right axillary region.    Malignancy: malignancy unknown        Initial size (mm):  20        Final defect size (mm):  20    Destruction method: scissors used for extraction  Lesion 6:      1% lidocaine used to numb area    Skin lesion sent for pathology      Some portions of this record may have been generated with voice recognition software. There may be translation, syntax, or grammatical errors. Occasional wrong word or \"sound-a-like\" substitutions may have occurred due to the inherent limitations of the voice recognition software. Read the chart carefully and recognize, using context, where substations may have occurred. If you have any questions, please contact the dictating provider for clarification or correction, as needed.  "

## 2025-01-06 ENCOUNTER — RESULTS FOLLOW-UP (OUTPATIENT)
Dept: FAMILY MEDICINE CLINIC | Facility: MEDICAL CENTER | Age: 30
End: 2025-01-06